# Patient Record
Sex: MALE | Race: WHITE | NOT HISPANIC OR LATINO | Employment: FULL TIME | URBAN - NONMETROPOLITAN AREA
[De-identification: names, ages, dates, MRNs, and addresses within clinical notes are randomized per-mention and may not be internally consistent; named-entity substitution may affect disease eponyms.]

---

## 2017-01-04 ENCOUNTER — OFFICE VISIT (OUTPATIENT)
Dept: CARDIAC SURGERY | Facility: CLINIC | Age: 52
End: 2017-01-04

## 2017-01-04 VITALS
OXYGEN SATURATION: 92 % | BODY MASS INDEX: 23.17 KG/M2 | WEIGHT: 147.6 LBS | TEMPERATURE: 98.4 F | DIASTOLIC BLOOD PRESSURE: 65 MMHG | SYSTOLIC BLOOD PRESSURE: 98 MMHG | HEIGHT: 67 IN | HEART RATE: 71 BPM

## 2017-01-04 DIAGNOSIS — Z01.818 PREOP EXAMINATION: Primary | ICD-10-CM

## 2017-01-04 DIAGNOSIS — I25.10 ATHEROSCLEROSIS OF NATIVE CORONARY ARTERY OF NATIVE HEART WITHOUT ANGINA PECTORIS: ICD-10-CM

## 2017-01-04 PROCEDURE — 99203 OFFICE O/P NEW LOW 30 MIN: CPT | Performed by: THORACIC SURGERY (CARDIOTHORACIC VASCULAR SURGERY)

## 2017-01-04 NOTE — LETTER
January 4, 2017     Clemente Johnson MD  [facility] [place] [address]    Patient: Dashawn Castañeda   YOB: 1965   Date of Visit: 1/4/2017       Dear Dr. Elizabeth MD:    Dashawn Castañeda was in my office today. Below are the relevant portions of my assessment and plan of care.       Pt will need CABG, risks and benefits explained and understood. Pt wishes to proceed with our plans.        Dashawn was seen today for f/u schedule cabg.    Diagnoses and all orders for this visit:    Preop examination  -     US Vein Mapping Bilateral    Atherosclerosis of native coronary artery of native heart without angina pectoris  -     US Vein Mapping Bilateral                  If you have questions, please do not hesitate to call me. I look forward to following Dashawn along with you.         Sincerely,        Germain Son MD        CC: No Recipients

## 2017-01-04 NOTE — PROGRESS NOTES
Subjective   Patient ID: Dashawn Castañeda is a 51 y.o. male is being seen for consultation today at the request of Dr Oconnor    History of Present Illness   This is a 51-year-old male with past medical history significant for coronary artery disease with stent placed by Dr. Johnson in 2007. The patient stated that he has since stopped his heart medications. He started becoming more short of breath, weakness on exertion, bilateral lower extremity edema. He presented to [facility] in cardiac arrest and cardiogenic shock on pressors and mechanical ventilation. He was diagnosed with bilateral pneumonia and was treated accordingly with antibiotics. Once he was recovered, he was extubated on 12/19/2016 and placed on nasal cannula. He underwent cardiac catheterization today after an echocardiogram demonstrated LV dysfunction of 20% with findings of the mid LAD at 75%, circumflex at 90%, OM-1 at greater than 90%, LIMA to 70%, RCA mid at 100% with left to right collaterals, with ejection fraction of 20% and left end-diastolic pressure of greater than 35. We were asked to see the patient for consideration of revascularization surgery.       The following portions of the patient's history were reviewed and updated as appropriate: problem list.    Review of Systems   Constitution: Positive for malaise/fatigue.   HENT: Negative.    Eyes: Negative.    Cardiovascular: Negative for chest pain.   Respiratory: Negative.    Hematologic/Lymphatic: Negative.    Skin: Negative.    Musculoskeletal: Negative.    Gastrointestinal: Negative.    Genitourinary: Negative.    Neurological: Negative.    Psychiatric/Behavioral: The patient is nervous/anxious.         Objective   Physical Exam   Constitutional: He is oriented to person, place, and time. He appears well-developed and well-nourished. No distress.   HENT:   Head: Normocephalic and atraumatic.   Eyes: Conjunctivae and EOM are normal. Pupils are equal, round, and reactive to  light.   Neck: Neck supple.   Cardiovascular: Normal rate, regular rhythm, normal heart sounds and intact distal pulses.    Pulmonary/Chest: Effort normal and breath sounds normal.   Abdominal: Soft.   Musculoskeletal: Normal range of motion.   Neurological: He is alert and oriented to person, place, and time. He has normal reflexes.   Skin: Skin is warm and dry.   Psychiatric: He has a normal mood and affect. His behavior is normal. Judgment and thought content normal.       Independent Review of Radiographic Studies:    1. Severe multivessel coronary artery disease with significant lesions noted in the mid left anterior descending coronary artery, proximal circumflex, obtuse marginal 1 and obtuse marginal 2 coronary artery and right coronary artery as described above. 2. LV dysfunction with elevated left ventricular end-diastolic pressure of 35 mmHg. Left ventriculogram was hence not performed. Distal right coronary    filled via left to right collaterals.   Carotids:  No hemodynamically significant stenoses are identified  within either internal carotid artery.      Assessment/Plan    Pt will need CABG, risks and benefits explained and understood. Pt wishes to proceed with our plans.        Dashawn was seen today for f/u schedule cabg.    Diagnoses and all orders for this visit:    Preop examination  -     US Vein Mapping Bilateral    Atherosclerosis of native coronary artery of native heart without angina pectoris  -     US Vein Mapping Bilateral

## 2017-01-09 ENCOUNTER — HOSPITAL ENCOUNTER (OUTPATIENT)
Dept: OTHER | Facility: HOSPITAL | Age: 52
Discharge: HOME | End: 2017-01-09
Attending: THORACIC SURGERY (CARDIOTHORACIC VASCULAR SURGERY) | Admitting: THORACIC SURGERY (CARDIOTHORACIC VASCULAR SURGERY)

## 2017-01-24 ENCOUNTER — OFFICE VISIT (OUTPATIENT)
Dept: CARDIAC SURGERY | Facility: CLINIC | Age: 52
End: 2017-01-24

## 2017-01-24 VITALS
HEIGHT: 67 IN | SYSTOLIC BLOOD PRESSURE: 106 MMHG | WEIGHT: 147.8 LBS | BODY MASS INDEX: 23.2 KG/M2 | OXYGEN SATURATION: 96 % | HEART RATE: 91 BPM | DIASTOLIC BLOOD PRESSURE: 68 MMHG | TEMPERATURE: 97.4 F

## 2017-01-24 DIAGNOSIS — F17.200 TOBACCO DEPENDENCE: ICD-10-CM

## 2017-01-24 DIAGNOSIS — I25.10 CORONARY ARTERY DISEASE INVOLVING NATIVE CORONARY ARTERY OF NATIVE HEART WITHOUT ANGINA PECTORIS: Primary | ICD-10-CM

## 2017-01-24 DIAGNOSIS — Z09 FOLLOW-UP SURGERY CARE: ICD-10-CM

## 2017-01-24 PROCEDURE — 99406 BEHAV CHNG SMOKING 3-10 MIN: CPT | Performed by: NURSE PRACTITIONER

## 2017-01-24 PROCEDURE — 99024 POSTOP FOLLOW-UP VISIT: CPT | Performed by: NURSE PRACTITIONER

## 2017-01-24 RX ORDER — ASCORBIC ACID 500 MG
500 TABLET ORAL DAILY
COMMUNITY

## 2017-01-24 RX ORDER — CLOPIDOGREL BISULFATE 75 MG/1
1 TABLET ORAL DAILY
Refills: 5 | COMMUNITY
Start: 2017-01-16 | End: 2017-04-27 | Stop reason: SDUPTHER

## 2017-01-24 RX ORDER — CARVEDILOL 3.12 MG/1
1 TABLET ORAL 2 TIMES DAILY
Refills: 2 | COMMUNITY
Start: 2017-01-16 | End: 2017-01-24 | Stop reason: DRUGHIGH

## 2017-01-24 RX ORDER — CARVEDILOL 6.25 MG/1
3.12 TABLET ORAL 2 TIMES DAILY WITH MEALS
COMMUNITY
End: 2017-03-15

## 2017-01-24 RX ORDER — FUROSEMIDE 20 MG/1
1 TABLET ORAL DAILY
Refills: 0 | COMMUNITY
Start: 2017-01-16 | End: 2017-02-01

## 2017-01-24 RX ORDER — PRAVASTATIN SODIUM 20 MG
1 TABLET ORAL DAILY
Refills: 2 | COMMUNITY
Start: 2017-01-16 | End: 2017-04-27 | Stop reason: SDUPTHER

## 2017-01-24 RX ORDER — HYDROCODONE BITARTRATE AND ACETAMINOPHEN 5; 325 MG/1; MG/1
1 TABLET ORAL 4 TIMES DAILY PRN
Refills: 0 | COMMUNITY
Start: 2017-01-16 | End: 2017-03-15

## 2017-01-24 RX ORDER — DIGOXIN 125 MCG
125 TABLET ORAL
Qty: 30 TABLET | Refills: 3 | Status: SHIPPED | OUTPATIENT
Start: 2017-01-24 | End: 2017-04-27 | Stop reason: SDUPTHER

## 2017-01-24 RX ORDER — MULTIVITAMIN WITH IRON
2 TABLET ORAL DAILY
COMMUNITY

## 2017-01-24 NOTE — PATIENT INSTRUCTIONS
Signs and symptoms of infection including drainage from operative site, redness, swelling, with associated fever and/or chills notify Heart and Vascular center immediately for wound check.      Clean operative site with antibacterial soap/water, pat dry. Keep open to air unless draining, then may apply dry dressing.  No ointments or creams unless prescribed by provider.    Smoking cessation assistance options offered including behavioral counseling (Smoking Cessation Classes), Nicotine replacement therapy (patches or gum), pharmacologic therapy (Chantix, Wellbutrin).  Discussion and question answer period 5-7 minutes.     Keep scheduled Follow up: Dr. Son with Labs, CXR, EKG    Med Mgmt: ASA,PLAVIX,COREG,STATIN. Restart Digoxin

## 2017-01-24 NOTE — PROGRESS NOTES
Subjective   Patient ID: Dashawn Castañeda is a 51 y.o. male is here today for follow-up CABG.    History of Present Illness  The following portions of the patient's history were reviewed and updated as appropriate: allergies, current medications, past family history, past medical history, past social history, past surgical history and problem list.  PCP: Elizabeth  Card: Elvin    History of Present Illness   This is a 51-year-old male with past medical history significant for coronary artery disease with stent placed by Dr. Johnson in 2007. The patient stated that he has since stopped his heart medications. He started becoming more short of breath, weakness on exertion, bilateral lower extremity edema. He presented to [facility] in cardiac arrest and cardiogenic shock on pressors and mechanical ventilation. He was diagnosed with bilateral pneumonia and was treated accordingly with antibiotics. Once he was recovered, he was extubated on 12/19/2016 and placed on nasal cannula. He underwent cardiac catheterization today after an echocardiogram demonstrated LV dysfunction of 20% with findings of the mid LAD at 75%, circumflex at 90%, OM-1 at greater than 90%, LIMA to 70%, RCA mid at 100% with left to right collaterals, with ejection fraction of 20% and left end-diastolic pressure of greater than 35. Satisfactory preop workup was completed and he presented for CABG in which he recovered well.  Returns for follow up continues to smoke.     12/22/16: Carotid duplex: 0% BICA Stenosis  12/2016: EF 20%. Valves WNL  1/12/17: CABGX4 LIMA-LAD, SVG-OM1, SVG-OM2, SVG-PDA, EVH      Current Outpatient Prescriptions:   •  albuterol (PROVENTIL) (5 MG/ML) 0.5% nebulizer solution, Take 2.5 mg by nebulization Every 6 (Six) Hours As Needed for wheezing., Disp: , Rfl:   •  aspirin 81 MG chewable tablet, Chew 81 mg Daily., Disp: , Rfl:   •  carvedilol (COREG) 6.25 MG tablet, Take 6.25 mg by mouth 2 (Two) Times a Day With Meals., Disp: ,  Rfl:   •  clopidogrel (PLAVIX) 75 MG tablet, Take 1 tablet by mouth Daily., Disp: , Rfl: 5  •  digoxin (LANOXIN) 125 MCG tablet, Take 1 tablet by mouth Daily., Disp: 30 tablet, Rfl: 3  •  furosemide (LASIX) 20 MG tablet, Take 1 tablet by mouth Daily., Disp: , Rfl: 0  •  HYDROcodone-acetaminophen (NORCO) 5-325 MG per tablet, Take 1 tablet by mouth 4 (Four) Times a Day As Needed., Disp: , Rfl: 0  •  Magnesium 250 MG tablet, Take 2 tablets by mouth Daily., Disp: , Rfl:   •  Multiple Vitamins-Minerals (COMPLETE MULTIVITAMIN/MINERAL PO), Take 1 tablet by mouth Daily., Disp: , Rfl:   •  pravastatin (PRAVACHOL) 20 MG tablet, Take 1 tablet by mouth Daily., Disp: , Rfl: 2  •  Sennosides-Docusate Sodium (SENNA S PO), Take  by mouth., Disp: , Rfl:   •  vitamin C (ASCORBIC ACID) 500 MG tablet, Take 500 mg by mouth Daily., Disp: , Rfl:     Review of Systems   Constitution: Negative for fever, weakness and malaise/fatigue.   HENT: Negative for hoarse voice.    Eyes: Negative for visual disturbance.   Cardiovascular: Positive for leg swelling. Negative for chest pain, claudication, cyanosis and dyspnea on exertion.   Respiratory: Negative for cough and shortness of breath.    Hematologic/Lymphatic: Negative for bleeding problem.   Skin: Negative for poor wound healing.   Musculoskeletal: Negative for falls.   Gastrointestinal: Negative for abdominal pain, constipation and dysphagia.   Genitourinary: Negative for dysuria.   Neurological: Negative for light-headedness.   All other systems reviewed and are negative.       Objective   Physical Exam   Constitutional: He is oriented to person, place, and time. He appears well-developed.   HENT:   Head: Normocephalic.   Eyes: Pupils are equal, round, and reactive to light.   Neck: Normal range of motion.   Cardiovascular: Normal rate, normal heart sounds and intact distal pulses.    Pulmonary/Chest: Effort normal and breath sounds normal. No respiratory distress.   Abdominal: Soft. Bowel  sounds are normal.   Musculoskeletal: Normal range of motion. He exhibits edema.   LLE - EVH 2+ edema. Sternum stable with moderate pressure. CT sutures removed, steri strips applied. CDI.   Neurological: He is alert and oriented to person, place, and time.   Skin: Skin is warm and dry. There is erythema.   LLE - EVH sites with resolving eccchymosis. Erythema around incisional sites. Sutures removed, steri strips applied.   Vitals reviewed.      Assessment/Plan   Independent Review of Radiographic Studies:    Detailed discussion regarding risks, benefits, and treatment plan.  Patient understands, agrees, and wishes to proceed with plan.  Progressing well.     1. Coronary artery disease involving native coronary artery of native heart without angina pectoris  Med Mgmt: ASA,PLAVIX,BETA,STATIN,DIG. HOLD ARB.  - digoxin (LANOXIN) 125 MCG tablet; Take 1 tablet by mouth Daily.  Dispense: 30 tablet; Refill: 3  - CBC (No Diff); Future  - Comprehensive Metabolic Panel; Future  - XR Chest 2 View; Future  - ECG 12 Lead    2. Follow-up surgery care  Lifting restrictions 12 weeks.  Keep Follow up with Dr. Son  Cardiac Rehab appointment  Signs and symptoms of infection including drainage from operative site, redness, swelling, with associated fever and/or chills notify Heart and Vascular center immediately for wound check.    Clean operative site with antibacterial soap/water, pat dry. Keep open to air unless draining, then may apply dry dressing.  No ointments or creams unless prescribed by provider.   Apply compression stocking LLE, elevate higher than hip QID (GENE Hose given #1)    3. Tobacco dependence  Smoking cessation assistance options offered including behavioral counseling (Smoking Cessation Classes), Nicotine replacement therapy (patches or gum), pharmacologic therapy (Chantix, Wellbutrin).  Discussion and question answer period 5-7 minutes. Advised against recreational drug use: Marijuana.

## 2017-01-24 NOTE — MR AVS SNAPSHOT
Dashawn Castañeda   1/24/2017 9:40 AM   Office Visit    Dept Phone:  [phone]   Encounter #:  [id number]    Provider:  NARENDRA Canela   Department:  [facility] CARDIOTHORACIC AND VASCULAR SURGERY                Your Full Care Plan              Today's Medication Changes          These changes are accurate as of: 1/24/17 10:10 AM.  If you have any questions, ask your nurse or doctor.               Medication(s)that have changed:     carvedilol 6.25 MG tablet   Commonly known as:  COREG   Take 6.25 mg by mouth 2 (Two) Times a Day With Meals.   What changed:  Another medication with the same name was removed. Continue taking this medication, and follow the directions you see here.   Changed by:  NARENDRA Canela       furosemide 20 MG tablet   Commonly known as:  LASIX   Take 1 tablet by mouth Daily.   What changed:  Another medication with the same name was removed. Continue taking this medication, and follow the directions you see here.   Changed by:  NARENDRA Canela         Stop taking medication(s)listed here:     DIUREX .5 MG tablet   Generic drug:  Caffeine-Magnesium Salicylate   Stopped by:  NARENDRA Canela           losartan 25 MG tablet   Commonly known as:  COZAAR   Stopped by:  NARENDRA Canela           spironolactone 25 MG tablet   Commonly known as:  ALDACTONE   Stopped by:  NARENDRA Canela                Where to Get Your Medications      These medications were sent to [address] [phone] [place] [id number] [address] [address]     Phone:  [phone]     digoxin 125 MCG tablet                  Your Updated Medication List          This list is accurate as of: 1/24/17 10:10 AM.  Always use your most recent med list.                albuterol (5 MG/ML) 0.5% nebulizer solution   Commonly known as:  PROVENTIL       aspirin 81 MG  chewable tablet       carvedilol 6.25 MG tablet   Commonly known as:  COREG       clopidogrel 75 MG tablet   Commonly known as:  PLAVIX       COMPLETE MULTIVITAMIN/MINERAL PO       digoxin 125 MCG tablet   Commonly known as:  LANOXIN   Take 1 tablet by mouth Daily.       furosemide 20 MG tablet   Commonly known as:  LASIX       HYDROcodone-acetaminophen 5-325 MG per tablet   Commonly known as:  NORCO       Magnesium 250 MG tablet       pravastatin 20 MG tablet   Commonly known as:  PRAVACHOL       SENNA S PO       vitamin C 500 MG tablet   Commonly known as:  ASCORBIC ACID               We Performed the Following     ECG 12 Lead       You Were Diagnosed With        Codes Comments    Coronary artery disease involving native coronary artery of native heart without angina pectoris    -  Primary ICD-10-CM: I25.10  ICD-9-CM: 414.01     Follow-up surgery care     ICD-10-CM: Z09  ICD-9-CM: V67.00       Instructions    Signs and symptoms of infection including drainage from operative site, redness, swelling, with associated fever and/or chills notify Heart and Vascular center immediately for wound check.      Clean operative site with antibacterial soap/water, pat dry. Keep open to air unless draining, then may apply dry dressing.  No ointments or creams unless prescribed by provider.    Smoking cessation assistance options offered including behavioral counseling (Smoking Cessation Classes), Nicotine replacement therapy (patches or gum), pharmacologic therapy (Chantix, Wellbutrin).  Discussion and question answer period 5-7 minutes.     Keep scheduled Follow up: Dr. Son with Labs, CXR, EKG    Med Mgmt: ASA,PLAVIX,COREG,STATIN. Restart Digoxin     Patient Instructions History      Upcoming Appointments     Visit Type Date Time Department    FOLLOW UP 1/24/2017  9:40 AM [facility] SURGERY [facility]    OFFICE VISIT 1/27/2017  1:15 PM [address]    FOLLOW UP 2/1/2017  8:00 AM [facility] SURGERY [facility]    HOSPITAL FOLLOW UP  "3/14/2017  8:45 AM [facility]      Sandhya Signup     Our records indicate that you have declined [facility] signup. If you would like to sign up for [employer], please email [email or web address] or call [phone] to obtain an activation code.             Other Info from Your Visit           Your Appointments     Jan 27, 2017  1:15 PM CST   Office Visit with Clemente Johnson MD   [facility] FAMILY MEDICINE (--)    [address]  [address] [facility] [address] [phone]           Arrive 15 minutes prior to appointment.            Feb 01, 2017  8:00 AM CST   Follow Up with Germain Son MD   [facility] CARDIOTHORACIC AND VASCULAR SURGERY (--)    [address] [place] [address] [address]  [address] [place] [address] [phone]           Arrive 15 minutes prior to appointment.            Mar 14, 2017  8:45 AM T   Hospital Follow Up with Alicia Pearce MD   [facility] CARDIOLOGY (--)    [address] [facility] [address]  [facility] [address] [phone]              Allergies     No Known Allergies      Reason for Visit     Coronary Artery Disease 1 week f/u CABGx4 1/12/17      Vital Signs     Blood Pressure Pulse Temperature Height Weight Oxygen Saturation    106/68 (BP Location: Left arm) 91 97.4 °F (36.3 °C) (Oral) 67\" (170.2 cm) 147 lb 12.8 oz (67 kg) 96%    Body Mass Index Smoking Status                23.15 kg/m2 Current Some Day Smoker          Problems and Diagnoses Noted     Coronary artery disease involving native coronary artery without angina pectoris    Encounter for examination following surgery        "

## 2017-01-27 ENCOUNTER — LAB (OUTPATIENT)
Dept: LAB | Facility: HOSPITAL | Age: 52
End: 2017-01-27

## 2017-01-27 ENCOUNTER — OFFICE VISIT (OUTPATIENT)
Dept: FAMILY MEDICINE CLINIC | Facility: CLINIC | Age: 52
End: 2017-01-27

## 2017-01-27 VITALS
OXYGEN SATURATION: 99 % | DIASTOLIC BLOOD PRESSURE: 70 MMHG | BODY MASS INDEX: 23.07 KG/M2 | SYSTOLIC BLOOD PRESSURE: 108 MMHG | HEIGHT: 67 IN | HEART RATE: 87 BPM | WEIGHT: 147 LBS

## 2017-01-27 DIAGNOSIS — I25.10 CORONARY ARTERY DISEASE INVOLVING NATIVE CORONARY ARTERY OF NATIVE HEART WITHOUT ANGINA PECTORIS: ICD-10-CM

## 2017-01-27 DIAGNOSIS — J18.9 PNEUMONIA OF BOTH LOWER LOBES DUE TO INFECTIOUS ORGANISM: ICD-10-CM

## 2017-01-27 DIAGNOSIS — Z11.59 NEED FOR HEPATITIS C SCREENING TEST: ICD-10-CM

## 2017-01-27 DIAGNOSIS — I25.10 CORONARY ARTERY DISEASE INVOLVING NATIVE CORONARY ARTERY OF NATIVE HEART WITHOUT ANGINA PECTORIS: Primary | ICD-10-CM

## 2017-01-27 LAB
ALBUMIN SERPL-MCNC: 3.9 G/DL (ref 3.4–4.8)
ALBUMIN/GLOB SERPL: 1.1 G/DL (ref 1.1–1.8)
ALP SERPL-CCNC: 87 U/L (ref 38–126)
ALT SERPL W P-5'-P-CCNC: 29 U/L (ref 21–72)
ANION GAP SERPL CALCULATED.3IONS-SCNC: 8 MMOL/L (ref 5–15)
ARTICHOKE IGE QN: 69 MG/DL (ref 1–129)
AST SERPL-CCNC: 43 U/L (ref 17–59)
BILIRUB SERPL-MCNC: 0.6 MG/DL (ref 0.2–1.3)
BUN BLD-MCNC: 7 MG/DL (ref 7–21)
BUN/CREAT SERPL: 9.1 (ref 7–25)
CALCIUM SPEC-SCNC: 9.4 MG/DL (ref 8.4–10.2)
CHLORIDE SERPL-SCNC: 100 MMOL/L (ref 95–110)
CHOLEST SERPL-MCNC: 148 MG/DL (ref 0–199)
CO2 SERPL-SCNC: 32 MMOL/L (ref 22–31)
CREAT BLD-MCNC: 0.77 MG/DL (ref 0.7–1.3)
DEPRECATED RDW RBC AUTO: 55.5 FL (ref 35.1–43.9)
ERYTHROCYTE [DISTWIDTH] IN BLOOD BY AUTOMATED COUNT: 16.4 % (ref 11.5–14.5)
GFR SERPL CREATININE-BSD FRML MDRD: 107 ML/MIN/1.73 (ref 56–130)
GLOBULIN UR ELPH-MCNC: 3.4 GM/DL (ref 2.3–3.5)
GLUCOSE BLD-MCNC: 82 MG/DL (ref 60–100)
HCT VFR BLD AUTO: 35.1 % (ref 39–49)
HCV AB SER DONR QL: NEGATIVE
HCV S/C RATIO: 0.02 (ref 0–0.89)
HDLC SERPL-MCNC: 37 MG/DL (ref 60–200)
HGB BLD-MCNC: 11.1 G/DL (ref 13.7–17.3)
LDLC/HDLC SERPL: 1.9 {RATIO} (ref 0–3.55)
MCH RBC QN AUTO: 29.4 PG (ref 26.5–34)
MCHC RBC AUTO-ENTMCNC: 31.6 G/DL (ref 31.5–36.3)
MCV RBC AUTO: 92.9 FL (ref 80–98)
PLATELET # BLD AUTO: 449 10*3/MM3 (ref 150–450)
PMV BLD AUTO: 10.1 FL (ref 8–12)
POTASSIUM BLD-SCNC: 4.8 MMOL/L (ref 3.5–5.1)
PROT SERPL-MCNC: 7.3 G/DL (ref 6.3–8.6)
RBC # BLD AUTO: 3.78 10*6/MM3 (ref 4.37–5.74)
SODIUM BLD-SCNC: 140 MMOL/L (ref 137–145)
TRIGL SERPL-MCNC: 204 MG/DL (ref 20–199)
WBC NRBC COR # BLD: 10.28 10*3/MM3 (ref 3.2–9.8)

## 2017-01-27 PROCEDURE — 80053 COMPREHEN METABOLIC PANEL: CPT | Performed by: FAMILY MEDICINE

## 2017-01-27 PROCEDURE — 86803 HEPATITIS C AB TEST: CPT | Performed by: FAMILY MEDICINE

## 2017-01-27 PROCEDURE — 99214 OFFICE O/P EST MOD 30 MIN: CPT | Performed by: FAMILY MEDICINE

## 2017-01-27 PROCEDURE — 85027 COMPLETE CBC AUTOMATED: CPT | Performed by: NURSE PRACTITIONER

## 2017-01-27 PROCEDURE — 36415 COLL VENOUS BLD VENIPUNCTURE: CPT | Performed by: FAMILY MEDICINE

## 2017-01-27 PROCEDURE — 80061 LIPID PANEL: CPT | Performed by: FAMILY MEDICINE

## 2017-01-27 NOTE — PROGRESS NOTES
Subjective   Dashawn Castañeda is a 51 y.o. male.     Coronary Artery Disease   Presents for follow-up visit. Symptoms include leg swelling and weight gain. Pertinent negatives include no chest pain, chest pressure, chest tightness, palpitations or shortness of breath. Risk factors include hyperlipidemia. The symptoms have been stable.   Pneumonia   He complains of frequent throat clearing. There is no chest tightness, cough, difficulty breathing, hemoptysis, hoarse voice, shortness of breath, sputum production or wheezing. This is a new problem. The current episode started 1 to 4 weeks ago. The problem occurs constantly. The problem has been gradually improving. Pertinent negatives include no chest pain, fever or heartburn. Myalgias: at IV site.   Hyperlipidemia   Pertinent negatives include no chest pain or shortness of breath. Myalgias: at IV site.        The following portions of the patient's history were reviewed and updated as appropriate: allergies, current medications, past family history, past medical history, past social history, past surgical history and problem list.    Review of Systems   Constitutional: Positive for weight gain. Negative for chills and fever.   HENT: Negative for hoarse voice.    Respiratory: Negative for cough, hemoptysis, sputum production, chest tightness, shortness of breath and wheezing.    Cardiovascular: Positive for leg swelling. Negative for chest pain and palpitations.   Gastrointestinal: Negative for heartburn.        Dysphagia to solids since on vent.   Musculoskeletal: Myalgias: at IV site.   Skin: Positive for rash (ecchymosis left thigh and leg). Negative for wound.   Neurological: Positive for numbness.       Objective   Physical Exam   Constitutional: He is oriented to person, place, and time. He appears well-developed and well-nourished. No distress.   HENT:   Head: Normocephalic and atraumatic.   Right Ear: Hearing normal.   Left Ear: Hearing normal.   Mouth/Throat: Uvula  is midline and mucous membranes are normal.   Cardiovascular: Normal rate, regular rhythm, normal heart sounds and intact distal pulses.  Exam reveals no gallop and no friction rub.    No murmur heard.  Pulmonary/Chest: Effort normal. No respiratory distress. He has decreased breath sounds. He has no wheezes. He has rhonchi. He has no rales. He exhibits no tenderness.   Abdominal: Normal appearance. There is no hepatosplenomegaly.   Musculoskeletal: He exhibits edema. He exhibits no tenderness.       Vascular Status -  His exam exhibits right foot vasculature normal. His exam exhibits no right foot edema. His exam exhibits left foot vasculature normal and left foot edema.  Lymphadenopathy:        Right cervical: No superficial cervical, no deep cervical and no posterior cervical adenopathy present.       Left cervical: No superficial cervical, no deep cervical and no posterior cervical adenopathy present.   Neurological: He is alert and oriented to person, place, and time. No sensory deficit.   Skin: Skin is warm and dry. No rash noted. He is not diaphoretic.   Psychiatric: He has a normal mood and affect. His behavior is normal. Judgment and thought content normal. Cognition and memory are normal.   Nursing note and vitals reviewed.      Assessment/Plan   Problems Addressed this Visit        Cardiovascular and Mediastinum    Coronary artery disease involving native coronary artery without angina pectoris - Primary    Relevant Orders    Comprehensive Metabolic Panel       Respiratory    Pneumonia of both lower lobes due to infectious organism    Relevant Orders    Lipid Panel       Other    Need for hepatitis C screening test    Relevant Orders    Hepatitis C Antibody              Follow-up post CABG

## 2017-01-27 NOTE — MR AVS SNAPSHOT
Dashawn Castañeda   1/27/2017 1:15 PM   Office Visit    Dept Phone:  [phone]   Encounter #:  [id number]    Provider:  Clemente Johnson MD   Department:  [facility] FAMILY MEDICINE                Your Full Care Plan              Your Updated Medication List          This list is accurate as of: 1/27/17  1:32 PM.  Always use your most recent med list.                albuterol (5 MG/ML) 0.5% nebulizer solution   Commonly known as:  PROVENTIL       aspirin 81 MG chewable tablet       carvedilol 6.25 MG tablet   Commonly known as:  COREG       clopidogrel 75 MG tablet   Commonly known as:  PLAVIX       COMPLETE MULTIVITAMIN/MINERAL PO       digoxin 125 MCG tablet   Commonly known as:  LANOXIN   Take 1 tablet by mouth Daily.       furosemide 20 MG tablet   Commonly known as:  LASIX       HYDROcodone-acetaminophen 5-325 MG per tablet   Commonly known as:  NORCO       Magnesium 250 MG tablet       pravastatin 20 MG tablet   Commonly known as:  PRAVACHOL       SENNA S PO       vitamin C 500 MG tablet   Commonly known as:  ASCORBIC ACID               We Performed the Following     Comprehensive Metabolic Panel     Hepatitis C Antibody     Lipid Panel       You Were Diagnosed With        Codes Comments    Coronary artery disease involving native coronary artery of native heart without angina pectoris    -  Primary ICD-10-CM: I25.10  ICD-9-CM: 414.01     Pneumonia of both lower lobes due to infectious organism     ICD-10-CM: J18.9  ICD-9-CM: 483.8     Need for hepatitis C screening test     ICD-10-CM: Z11.59  ICD-9-CM: V73.89       Instructions     None    Patient Instructions History      Upcoming Appointments     Visit Type Date Time Department    OFFICE VISIT 1/27/2017  1:15 PM [facility] 4TH    FOLLOW UP 2/1/2017  8:00 AM [facility] CT VAS SURGERY MAD    XR MAD CHEST 2 VW 2/1/2017  7:45 AM  MAD XRAY    LAB 2/1/2017  7:40 AM [facility] LABORATORY    HOSPITAL FOLLOW UP 3/14/2017  8:45 AM  "[facility]    OFFICE VISIT 4/27/2017  1:30 PM [facility] MED MAD 4TH      MyChart Signup     Our records indicate that you have declined [facility] [employer] signup. If you would like to sign up for [employer], please email [email or web address] or call [phone] to obtain an activation code.             Other Info from Your Visit           Your Appointments     Feb 01, 2017  7:40 AM CST   Lab with LAB [facility]   [facility] LABORATORY ([address] [address] [phone]            Feb 01, 2017  7:45 AM CST   XR mad chest 2 vw with MAD XR 6   [facility] XRAY ([address] [address] [phone]           Bring along any outside films relating to this procedure. Arrive 15 minutes before your scheduled time.            Feb 01, 2017  8:00 AM CST   Follow Up with Germain Son MD   [facility] CARDIOTHORACIC AND VASCULAR SURGERY (--)    [address] [place] [address] [address]  [address] [facility] [address] [phone]           Arrive 15 minutes prior to appointment.            Mar 14, 2017  8:45 AM T   Hospital Follow Up with Alicia Pearce MD   [facility] CARDIOLOGY (--)    [address]  [facility] [address] [phone]            Apr 27, 2017  1:30 PM CDT   Office Visit with Clemente Johnson MD   [facility] FAMILY MEDICINE (--)    [address] [facility] [address]  [address] [facility] [address] [phone]           Arrive 15 minutes prior to appointment.              Allergies     No Known Allergies      Reason for Visit     Coronary Artery Disease 1 month recheck      Vital Signs     Blood Pressure Pulse Height Weight Oxygen Saturation Body Mass Index    108/70 87 67\" (170.2 cm) 147 lb (66.7 kg) 99% 23.02 kg/m2    Smoking Status                   Current Some Day Smoker         "   Problems and Diagnoses Noted     Coronary artery disease involving native coronary artery without angina pectoris    Need for hepatitis C screening test    Pneumonia of both lower lobes due to infectious organism

## 2017-01-30 ENCOUNTER — TELEPHONE (OUTPATIENT)
Dept: FAMILY MEDICINE CLINIC | Facility: CLINIC | Age: 52
End: 2017-01-30

## 2017-01-30 NOTE — TELEPHONE ENCOUNTER
Lab Results & Recommendations Relayed as Normal, Pr Dr. Johnson.  Patient is advised to continue his current medications and follow-up as recommended.     ----- Message from Clemente Johnson MD sent at 1/29/2017 11:40 AM CST -----  Ok, call or send card.

## 2017-02-01 ENCOUNTER — APPOINTMENT (OUTPATIENT)
Dept: LAB | Facility: HOSPITAL | Age: 52
End: 2017-02-01

## 2017-02-01 ENCOUNTER — HOSPITAL ENCOUNTER (OUTPATIENT)
Dept: GENERAL RADIOLOGY | Facility: HOSPITAL | Age: 52
Discharge: HOME | End: 2017-02-01
Admitting: NURSE PRACTITIONER

## 2017-02-01 ENCOUNTER — OFFICE VISIT (OUTPATIENT)
Dept: CARDIAC SURGERY | Facility: CLINIC | Age: 52
End: 2017-02-01

## 2017-02-01 VITALS
SYSTOLIC BLOOD PRESSURE: 113 MMHG | WEIGHT: 146.6 LBS | OXYGEN SATURATION: 99 % | HEART RATE: 74 BPM | HEIGHT: 67 IN | DIASTOLIC BLOOD PRESSURE: 81 MMHG | TEMPERATURE: 98.6 F | BODY MASS INDEX: 23.01 KG/M2

## 2017-02-01 DIAGNOSIS — Z09 FOLLOW-UP SURGERY CARE: ICD-10-CM

## 2017-02-01 DIAGNOSIS — I25.10 CORONARY ARTERY DISEASE INVOLVING NATIVE CORONARY ARTERY OF NATIVE HEART WITHOUT ANGINA PECTORIS: ICD-10-CM

## 2017-02-01 DIAGNOSIS — F17.200 TOBACCO DEPENDENCE: ICD-10-CM

## 2017-02-01 PROCEDURE — 99024 POSTOP FOLLOW-UP VISIT: CPT | Performed by: THORACIC SURGERY (CARDIOTHORACIC VASCULAR SURGERY)

## 2017-02-01 PROCEDURE — 71020 HC CHEST PA AND LATERAL: CPT

## 2017-02-01 PROCEDURE — 93010 ELECTROCARDIOGRAM REPORT: CPT | Performed by: INTERNAL MEDICINE

## 2017-02-01 PROCEDURE — 93005 ELECTROCARDIOGRAM TRACING: CPT | Performed by: NURSE PRACTITIONER

## 2017-02-01 NOTE — PROGRESS NOTES
"CARDIOVASCULAR SURGERY FOLLOW-UP PROGRESS NOTE    Subjective:     Patient is a 51 y.o. male who presents for follow up s/p CABG.  He comes in today complaining of nothing.  His activity level has been good.       Objective:         Visit Vitals   • /81 (BP Location: Left arm)   • Pulse 74   • Temp 98.6 °F (37 °C) (Oral)   • Ht 67\" (170.2 cm)   • Wt 146 lb 9.6 oz (66.5 kg)   • SpO2 99%   • BMI 22.96 kg/m2     Heart:  regular rate and rhythm, S1, S2 normal, no murmur, click, rub or gallop  Lungs:  clear to auscultation bilaterally  Extremities:  no edema  Incision(s):  left leg healing well, sternum stable    Data Review:  Labs:    CBC with Differential:    Lab Results   Component Value Date    WBC 10.28 (H) 01/27/2017    WBC 14.0 (H) 01/15/2017    RBC 3.78 (L) 01/27/2017    RBC 2.97 (L) 01/15/2017    HGB 11.1 (L) 01/27/2017    HGB 8.7 (L) 01/15/2017    HCT 35.1 (L) 01/27/2017    HCT 26.6 (L) 01/15/2017     01/27/2017     (L) 01/15/2017    MCV 92.9 01/27/2017    MCV 89.6 01/15/2017    MCH 29.4 01/27/2017    MCH 29.3 01/15/2017    MCHC 31.6 01/27/2017    MCHC 32.7 01/15/2017    RDW 16.4 (H) 01/27/2017    RDW 14.5 01/15/2017   [  BMP:     Lab Results   Component Value Date     01/27/2017     01/15/2017    K 4.8 01/27/2017    K 4.6 01/16/2017     01/27/2017    CL 99 01/15/2017    CO2 32.0 (H) 01/27/2017    CO2 31 01/15/2017    BUN 7 01/27/2017    BUN 19 01/15/2017    CREATININE 0.77 01/27/2017    CREATININE 0.7 01/15/2017     CXR:  normal, no significant pleural effusion    Assessment:     S/P CABG    No significant post-op complications    Plan:     No heavy lifting > 10 pounds for 6 more weeks  Keep incisions clean and dry  OK to begin cardiac rehab  Follow-up as scheduled with cardiology  Return to clinic in 4 week(s) with no new studies  D/c lasix  "

## 2017-03-15 ENCOUNTER — OFFICE VISIT (OUTPATIENT)
Dept: CARDIOLOGY | Facility: CLINIC | Age: 52
End: 2017-03-15

## 2017-03-15 VITALS
DIASTOLIC BLOOD PRESSURE: 78 MMHG | WEIGHT: 149 LBS | HEIGHT: 67 IN | SYSTOLIC BLOOD PRESSURE: 110 MMHG | HEART RATE: 72 BPM | BODY MASS INDEX: 23.39 KG/M2

## 2017-03-15 DIAGNOSIS — Z95.1 S/P CABG (CORONARY ARTERY BYPASS GRAFT): ICD-10-CM

## 2017-03-15 DIAGNOSIS — I25.5 ISCHEMIC CARDIOMYOPATHY: ICD-10-CM

## 2017-03-15 DIAGNOSIS — I25.10 CORONARY ARTERY DISEASE INVOLVING NATIVE CORONARY ARTERY OF NATIVE HEART WITHOUT ANGINA PECTORIS: Primary | ICD-10-CM

## 2017-03-15 PROCEDURE — 93000 ELECTROCARDIOGRAM COMPLETE: CPT | Performed by: INTERNAL MEDICINE

## 2017-03-15 PROCEDURE — 99213 OFFICE O/P EST LOW 20 MIN: CPT | Performed by: INTERNAL MEDICINE

## 2017-03-15 RX ORDER — CARVEDILOL 3.12 MG/1
3.12 TABLET ORAL 2 TIMES DAILY WITH MEALS
COMMUNITY
End: 2017-04-27 | Stop reason: SDUPTHER

## 2017-03-15 NOTE — PROGRESS NOTES
Dashawn Castañeda  52 y.o. male    03/15/2017  1. Coronary artery disease involving native coronary artery of native heart without angina pectoris    2. Ischemic cardiomyopathy    3. S/P CABG (coronary artery bypass graft)        History of Present Illness    Mr. Castañeda is a 52-year-old  male with severe multivessel coronary artery disease and ischemic cardiomyopathy.  He presented with the cardiac respiratory arrest in December 2016 and cardiac catheterization showed multivessel CAD.  He underwent coronary coronary artery bypass surgery in January 2017 and has done remarkably well since the procedure.  He returned to work about 3 weeks after bypass surgery and has gone through cardiac rehabilitation.  He denied any chest pain or shortness of breath and has been physically quite active.    His EKG today showed sinus rhythm heart rate of 77 bpm mild IVCD and nonspecific ST-T changes and poor R wave progression in the anteroseptal leads QRS duration was 110 ms.  No signs of congestive heart failure was noted.        SUBJECTIVE    No Known Allergies      Past Medical History   Diagnosis Date   • Allergic      gras   • Anxiety    • CHF (congestive heart failure)    • COPD (chronic obstructive pulmonary disease)    • Coronary artery disease      post MI Stents   • Hyperlipidemia    • Hypertension    • Myocardial infarction          Past Surgical History   Procedure Laterality Date   • Cardiac catheterization     • Coronary artery bypass graft           Family History   Problem Relation Age of Onset   • Hypertension Mother    • Heart disease Father          Social History     Social History   • Marital status:      Spouse name: N/A   • Number of children: N/A   • Years of education: N/A     Occupational History   • Not on file.     Social History Main Topics   • Smoking status: Former Smoker     Packs/day: 0.50     Years: 38.00     Types: Cigarettes   • Smokeless tobacco: Never Used   • Alcohol use No       "Comment: quit 1 year ago   • Drug use: Yes     Special: Marijuana   • Sexual activity: Yes     Other Topics Concern   • Not on file     Social History Narrative         Current Outpatient Prescriptions   Medication Sig Dispense Refill   • albuterol (PROVENTIL) (5 MG/ML) 0.5% nebulizer solution Take 2.5 mg by nebulization Every 6 (Six) Hours As Needed for wheezing.     • aspirin 81 MG chewable tablet Chew 81 mg Daily.     • carvedilol (COREG) 3.125 MG tablet Take 3.125 mg by mouth 2 (Two) Times a Day With Meals.     • clopidogrel (PLAVIX) 75 MG tablet Take 1 tablet by mouth Daily.  5   • digoxin (LANOXIN) 125 MCG tablet Take 1 tablet by mouth Daily. 30 tablet 3   • Magnesium 250 MG tablet Take 2 tablets by mouth Daily.     • Multiple Vitamins-Minerals (COMPLETE MULTIVITAMIN/MINERAL PO) Take 1 tablet by mouth Daily.     • pravastatin (PRAVACHOL) 20 MG tablet Take 1 tablet by mouth Daily.  2   • Sennosides-Docusate Sodium (SENNA S PO) Take  by mouth.     • vitamin C (ASCORBIC ACID) 500 MG tablet Take 500 mg by mouth Daily.       No current facility-administered medications for this visit.          OBJECTIVE    Visit Vitals   • /78   • Pulse 72   • Ht 67\" (170.2 cm)   • Wt 149 lb (67.6 kg)   • BMI 23.34 kg/m2           Review of Systems     Constitutional:  Denies recent weight loss, weight gain, fever or chills     HENT:  Denies any hearing loss, epistaxis, hoarseness, or difficulty speaking.     Eyes: Wears eyeglasses or contact lenses     Respiratory:  Denies dyspnea with exertion,no cough, wheezing, or hemoptysis.     Cardiovascular: Negative for palpations, chest pain, orthopnea, PND, peripheral edema, syncope, or claudication.     Gastrointestinal:  Denies change in bowel habits, dyspepsia, ulcer disease, hematochezia, or melena.     Endocrine: Negative for cold intolerance, heat intolerance, polydipsia, polyphagia and polyuria. Denies any history of weight change, heat/cold intolerance, polydipsia, polyuria "     Genitourinary: Negative.      Musculoskeletal: Denies any history of arthritic symptoms or back problems     Skin:  Denies any change in hair or nails, rashes, or skin lesions.     Allergic/Immunologic: Negative.  Negative for environmental allergies, food allergies and immunocompromised state.     Neurological:  Denies any history of recurrent headaches, strokes, TIA, or seizure disorder.     Hematological: Denies any food allergies, seasonal allergies, bleeding disorders, or lymphadenopathy.     Psychiatric/Behavioral: Denies any history of depression, substance abuse, or change in cognitive function.         Physical Exam     Constitutional: Cooperative, alert and oriented, well-developed, well-nourished, in no acute distress.     HENT:   Head: Normocephalic, normal hair patterns, no masses or tenderness.  Ears, Nose, and Throat: No gross abnormalities. No pallor or cyanosis. Dentition good.   Eyes: EOMS intact, PERRL, conjunctivae and lids unremarkable. Fundoscopic exam and visual fields not performed.   Neck: No palpable masses or adenopathy, no thyromegaly, no JVD, carotid pulses are full and equal bilaterally and without  Bruits.     Cardiovascular: Regular rhythm, S1 and S2 normal, no S3 or S4. Apical impulse not displaced. No murmurs, gallops, or rubs detected.     Pulmonary/Chest: Chest: normal symmetry, no tenderness to palpation, normal respiratory excursion, incision healed well           Pulmonary: Normal breath sounds. No rales or ronchi.    Abdominal: Abdomen soft, bowel sounds normoactive, no masses, no hepatosplenomegaly, non-tender, no bruits.     Musculoskeletal: No deformities, clubbing, cyanosis, erythema, or edema observed. There are no spinal abnormalities noted. Normal muscle strength and tone. Pulses full and equal in all extremities, no bruits auscultated.     Neurological: No gross motor or sensory deficits noted, affect appropriate, oriented to time, person, place.     Skin: Warm and  dry to the touch, no apparent skin lesions or masses noted.     Psychiatric: He has a normal mood and affect. His behavior is normal. Judgment and thought content normal.           ECG 12 Lead  Date/Time: 3/15/2017 9:41 AM  Performed by: VITALIY PITTS  Authorized by: VITALIY PITTS   Rhythm: sinus rhythm  Rate: normal  Conduction: non-specific intraventricular conduction delay  QRS axis: right  Comments: EKG showed sinus rhythm heart rate of 77 bpm.  Left atrial enlargement.  Right axis deviation.  Nonspecific IVCD and nonspecific ST-T changes              Lab Results   Component Value Date    WBC 10.28 (H) 01/27/2017    HGB 11.1 (L) 01/27/2017    HCT 35.1 (L) 01/27/2017    MCV 92.9 01/27/2017     01/27/2017     Lab Results   Component Value Date    GLUCOSE 82 01/27/2017    BUN 7 01/27/2017    CREATININE 0.77 01/27/2017    EGFRIFNONA 107 01/27/2017    BCR 9.1 01/27/2017    CO2 32.0 (H) 01/27/2017    CALCIUM 9.4 01/27/2017    ALBUMIN 3.90 01/27/2017    LABIL2 1.1 01/27/2017    AST 43 01/27/2017    ALT 29 01/27/2017     Lab Results   Component Value Date    CHOL 148 01/27/2017     Lab Results   Component Value Date    TRIG 204 (H) 01/27/2017     Lab Results   Component Value Date    HDL 37 (L) 01/27/2017     No results found for: LDLCALC  No results found for: LDL  No results found for: HDLLDLRATIO  No components found for: CHOLHDL  Lab Results   Component Value Date    HGBA1C 5.8 (H) 01/09/2017     Lab Results   Component Value Date    TSH 1.20 01/09/2017           ASSESSMENT AND PLAN    Mr. Castañeda is progressing well following his coronary artery bypass surgery for multivessel CAD.  No signs of angina, arrhythmia or congestive heart failure was noted.  To reassess his left ventricular function and echocardiogram is being arranged.  His ejection fraction was 15% prior to bypass surgery.  His present antiplatelet therapy with aspirin, Plavix management of LV dysfunction with Coreg, digoxin  and lipid-lowering therapy with protocol has been continued.  I'll consider adding losartan after assessing his LV function.  He has had borderline blood pressures and this is a reason why ACE/ARB was not started.  Diagnoses and all orders for this visit:    Coronary artery disease involving native coronary artery of native heart without angina pectoris  -     Adult Transthoracic Echo Complete; Future    Ischemic cardiomyopathy  -     Adult Transthoracic Echo Complete; Future    S/P CABG (coronary artery bypass graft)  -     Adult Transthoracic Echo Complete; Future        Alicia Pearce MD  3/15/2017  9:34 AM

## 2017-04-13 ENCOUNTER — TELEPHONE (OUTPATIENT)
Dept: CARDIOLOGY | Facility: CLINIC | Age: 52
End: 2017-04-13

## 2017-04-13 ENCOUNTER — DOCUMENTATION (OUTPATIENT)
Dept: CARDIOLOGY | Facility: CLINIC | Age: 52
End: 2017-04-13

## 2017-04-13 RX ORDER — LOSARTAN POTASSIUM 25 MG/1
25 TABLET ORAL DAILY
Qty: 30 TABLET | Refills: 6 | Status: SHIPPED | OUTPATIENT
Start: 2017-04-13 | End: 2017-10-27 | Stop reason: SDUPTHER

## 2017-04-27 ENCOUNTER — OFFICE VISIT (OUTPATIENT)
Dept: FAMILY MEDICINE CLINIC | Facility: CLINIC | Age: 52
End: 2017-04-27

## 2017-04-27 VITALS
WEIGHT: 141 LBS | HEART RATE: 74 BPM | SYSTOLIC BLOOD PRESSURE: 110 MMHG | DIASTOLIC BLOOD PRESSURE: 70 MMHG | BODY MASS INDEX: 22.08 KG/M2 | OXYGEN SATURATION: 100 %

## 2017-04-27 DIAGNOSIS — I25.10 CORONARY ARTERY DISEASE INVOLVING NATIVE CORONARY ARTERY OF NATIVE HEART WITHOUT ANGINA PECTORIS: ICD-10-CM

## 2017-04-27 PROCEDURE — 99214 OFFICE O/P EST MOD 30 MIN: CPT | Performed by: FAMILY MEDICINE

## 2017-04-27 RX ORDER — CARVEDILOL 3.12 MG/1
3.12 TABLET ORAL 2 TIMES DAILY WITH MEALS
Qty: 60 TABLET | Refills: 5 | Status: SHIPPED | OUTPATIENT
Start: 2017-04-27 | End: 2017-10-02 | Stop reason: SDUPTHER

## 2017-04-27 RX ORDER — DIGOXIN 125 MCG
125 TABLET ORAL
Qty: 30 TABLET | Refills: 5 | Status: SHIPPED | OUTPATIENT
Start: 2017-04-27 | End: 2017-10-27 | Stop reason: SDUPTHER

## 2017-04-27 RX ORDER — CLOPIDOGREL BISULFATE 75 MG/1
75 TABLET ORAL DAILY
Qty: 30 TABLET | Refills: 5 | Status: SHIPPED | OUTPATIENT
Start: 2017-04-27 | End: 2017-10-27 | Stop reason: SDUPTHER

## 2017-04-27 RX ORDER — PRAVASTATIN SODIUM 20 MG
20 TABLET ORAL DAILY
Qty: 30 TABLET | Refills: 5 | Status: SHIPPED | OUTPATIENT
Start: 2017-04-27 | End: 2017-08-01 | Stop reason: DRUGHIGH

## 2017-04-27 NOTE — PROGRESS NOTES
Subjective   Dashawn Castañeda is a 52 y.o. male.     Hypertension   This is a chronic problem. The current episode started more than 1 year ago. The problem is unchanged. The problem is controlled. Associated symptoms include chest pain (chest wall sore) and peripheral edema (at doner site). Pertinent negatives include no orthopnea, palpitations, PND or shortness of breath.   Coronary Artery Disease   Presents for follow-up visit. Symptoms include chest pain (chest wall sore), leg swelling and weight gain. Pertinent negatives include no chest pressure, chest tightness, palpitations or shortness of breath. Risk factors include hyperlipidemia. The symptoms have been stable.   Hyperlipidemia   Associated symptoms include chest pain (chest wall sore). Pertinent negatives include no myalgias or shortness of breath.        The following portions of the patient's history were reviewed and updated as appropriate: allergies, current medications, past family history, past medical history, past social history, past surgical history and problem list.    Review of Systems   Constitutional: Positive for weight gain. Negative for chills.   Respiratory: Negative for chest tightness and shortness of breath.    Cardiovascular: Positive for chest pain (chest wall sore) and leg swelling. Negative for palpitations, orthopnea and PND.   Gastrointestinal:        Dysphagia to solids since on vent.   Musculoskeletal: Negative for myalgias.   Skin: Positive for rash (ecchymosis left thigh and leg). Negative for wound.   Neurological: Positive for numbness.       Objective   Physical Exam   Constitutional: He is oriented to person, place, and time. He appears well-developed and well-nourished. No distress.   HENT:   Head: Normocephalic and atraumatic.   Right Ear: Hearing normal.   Left Ear: Hearing normal.   Mouth/Throat: Uvula is midline and mucous membranes are normal.   Cardiovascular: Normal rate, regular rhythm, normal heart sounds and  intact distal pulses.  Exam reveals no gallop and no friction rub.    No murmur heard.  Pulmonary/Chest: Effort normal. No respiratory distress. He has no decreased breath sounds. He has no wheezes. He has no rhonchi. He has no rales. He exhibits no tenderness.   Abdominal: Normal appearance. There is no hepatosplenomegaly.   Musculoskeletal: He exhibits edema. He exhibits no tenderness.       Vascular Status -  His exam exhibits right foot vasculature normal. His exam exhibits no right foot edema. His exam exhibits left foot vasculature normal and left foot edema.  Lymphadenopathy:        Right cervical: No superficial cervical, no deep cervical and no posterior cervical adenopathy present.       Left cervical: No superficial cervical, no deep cervical and no posterior cervical adenopathy present.   Neurological: He is alert and oriented to person, place, and time. No sensory deficit.   Skin: Skin is warm and dry. No rash noted. He is not diaphoretic.   Psychiatric: He has a normal mood and affect. His behavior is normal. Judgment and thought content normal. Cognition and memory are normal.   Nursing note and vitals reviewed.      Assessment/Plan   Problems Addressed this Visit        Cardiovascular and Mediastinum    Coronary artery disease involving native coronary artery without angina pectoris    Relevant Medications    carvedilol (COREG) 3.125 MG tablet    clopidogrel (PLAVIX) 75 MG tablet    digoxin (LANOXIN) 125 MCG tablet              Follow-up post CABG

## 2017-07-27 ENCOUNTER — APPOINTMENT (OUTPATIENT)
Dept: LAB | Facility: HOSPITAL | Age: 52
End: 2017-07-27

## 2017-07-27 ENCOUNTER — OFFICE VISIT (OUTPATIENT)
Dept: FAMILY MEDICINE CLINIC | Facility: CLINIC | Age: 52
End: 2017-07-27

## 2017-07-27 VITALS
BODY MASS INDEX: 23.95 KG/M2 | HEART RATE: 65 BPM | OXYGEN SATURATION: 98 % | WEIGHT: 152.6 LBS | DIASTOLIC BLOOD PRESSURE: 80 MMHG | HEIGHT: 67 IN | SYSTOLIC BLOOD PRESSURE: 122 MMHG

## 2017-07-27 DIAGNOSIS — I10 ESSENTIAL HYPERTENSION: ICD-10-CM

## 2017-07-27 DIAGNOSIS — E78.00 PURE HYPERCHOLESTEROLEMIA: ICD-10-CM

## 2017-07-27 DIAGNOSIS — I25.10 CORONARY ARTERY DISEASE INVOLVING NATIVE CORONARY ARTERY OF NATIVE HEART WITHOUT ANGINA PECTORIS: Primary | ICD-10-CM

## 2017-07-27 DIAGNOSIS — Z12.5 ENCOUNTER FOR PROSTATE CANCER SCREENING: ICD-10-CM

## 2017-07-27 LAB — PSA SERPL-MCNC: 1.15 NG/ML (ref 0–4)

## 2017-07-27 PROCEDURE — 86803 HEPATITIS C AB TEST: CPT | Performed by: FAMILY MEDICINE

## 2017-07-27 PROCEDURE — 80061 LIPID PANEL: CPT | Performed by: FAMILY MEDICINE

## 2017-07-27 PROCEDURE — 84153 ASSAY OF PSA TOTAL: CPT | Performed by: FAMILY MEDICINE

## 2017-07-27 PROCEDURE — 36415 COLL VENOUS BLD VENIPUNCTURE: CPT | Performed by: FAMILY MEDICINE

## 2017-07-27 PROCEDURE — 99214 OFFICE O/P EST MOD 30 MIN: CPT | Performed by: FAMILY MEDICINE

## 2017-07-27 PROCEDURE — 80053 COMPREHEN METABOLIC PANEL: CPT | Performed by: FAMILY MEDICINE

## 2017-07-27 RX ORDER — SULFAMETHOXAZOLE AND TRIMETHOPRIM 800; 160 MG/1; MG/1
1 TABLET ORAL 2 TIMES DAILY
Qty: 14 TABLET | Refills: 0 | Status: SHIPPED | OUTPATIENT
Start: 2017-07-27 | End: 2017-08-03

## 2017-07-27 NOTE — PROGRESS NOTES
Subjective   Dashawn Castañeda is a 52 y.o. male.     Hypertension   This is a chronic problem. The current episode started more than 1 year ago. The problem is unchanged. The problem is controlled. Pertinent negatives include no chest pain, orthopnea, palpitations, peripheral edema, PND or shortness of breath.   Coronary Artery Disease   Presents for follow-up visit. Symptoms include leg swelling and weight gain. Pertinent negatives include no chest pain, chest pressure, chest tightness, palpitations or shortness of breath. Risk factors include hyperlipidemia. The symptoms have been stable.   Hyperlipidemia   This is a chronic problem. The current episode started more than 1 year ago. The problem is controlled. Recent lipid tests were reviewed and are normal. Pertinent negatives include no chest pain, myalgias or shortness of breath.   URI    This is a recurrent problem. The current episode started in the past 7 days. The problem has been rapidly worsening. There has been no fever. Associated symptoms include congestion and rhinorrhea. Pertinent negatives include no chest pain, coughing or wheezing.        The following portions of the patient's history were reviewed and updated as appropriate: allergies, current medications, past family history, past medical history, past social history, past surgical history and problem list.    Review of Systems   Constitutional: Positive for weight gain. Negative for chills.   HENT: Positive for congestion and rhinorrhea.    Respiratory: Negative for cough, chest tightness, shortness of breath and wheezing.    Cardiovascular: Positive for leg swelling. Negative for chest pain, palpitations, orthopnea and PND.   Musculoskeletal: Negative for myalgias.   Neurological: Positive for numbness.       Objective   Physical Exam   Constitutional: He is oriented to person, place, and time. He appears well-developed and well-nourished. No distress.   HENT:   Head: Normocephalic and atraumatic.    Right Ear: Hearing normal.   Left Ear: Hearing normal.   Nose: Mucosal edema and rhinorrhea present. Right sinus exhibits maxillary sinus tenderness. Left sinus exhibits maxillary sinus tenderness.   Mouth/Throat: Uvula is midline, oropharynx is clear and moist and mucous membranes are normal.   Cardiovascular: Normal rate, regular rhythm, normal heart sounds and intact distal pulses.  Exam reveals no gallop and no friction rub.    No murmur heard.  Pulmonary/Chest: Effort normal. No respiratory distress. He has no decreased breath sounds. He has no wheezes. He has no rhonchi. He has no rales. He exhibits no tenderness.   Abdominal: Normal appearance. There is no hepatosplenomegaly.   Musculoskeletal: He exhibits edema. He exhibits no tenderness.       Vascular Status -  His exam exhibits right foot vasculature normal. His exam exhibits no right foot edema. His exam exhibits left foot vasculature normal and left foot edema.  Lymphadenopathy:        Right cervical: No superficial cervical, no deep cervical and no posterior cervical adenopathy present.       Left cervical: No superficial cervical, no deep cervical and no posterior cervical adenopathy present.   Neurological: He is alert and oriented to person, place, and time. No sensory deficit.   Skin: Skin is warm and dry. No rash noted. He is not diaphoretic.   Psychiatric: He has a normal mood and affect. His behavior is normal. Judgment and thought content normal. Cognition and memory are normal.   Nursing note and vitals reviewed.      Assessment/Plan   Problems Addressed this Visit        Cardiovascular and Mediastinum    Coronary artery disease involving native coronary artery without angina pectoris - Primary    Essential hypertension    Relevant Orders    Comprehensive Metabolic Panel    Pure hypercholesterolemia    Relevant Orders    Lipid Panel      Other Visit Diagnoses     Encounter for prostate cancer screening        Relevant Orders    PSA               Follow-up post CABG

## 2017-07-31 ENCOUNTER — TELEPHONE (OUTPATIENT)
Dept: FAMILY MEDICINE CLINIC | Facility: CLINIC | Age: 52
End: 2017-07-31

## 2017-07-31 NOTE — TELEPHONE ENCOUNTER
----- Message from Clemente Johnson MD sent at 7/28/2017 11:22 AM CDT -----  Cholesterol 2 high.  Increase pravastatin from 20 mg at night to 40 mg at night.  Call in a prescription and change the medicine list.  Recheck CMP and lipids in 3 months

## 2017-08-01 ENCOUNTER — TELEPHONE (OUTPATIENT)
Dept: FAMILY MEDICINE CLINIC | Facility: CLINIC | Age: 52
End: 2017-08-01

## 2017-08-01 DIAGNOSIS — E78.00 PURE HYPERCHOLESTEROLEMIA: Primary | ICD-10-CM

## 2017-08-01 RX ORDER — PRAVASTATIN SODIUM 40 MG
40 TABLET ORAL NIGHTLY
Qty: 30 TABLET | Refills: 3 | Status: SHIPPED | OUTPATIENT
Start: 2017-08-01 | End: 2017-10-27 | Stop reason: SDUPTHER

## 2017-09-13 ENCOUNTER — OFFICE VISIT (OUTPATIENT)
Dept: CARDIOLOGY | Facility: CLINIC | Age: 52
End: 2017-09-13

## 2017-09-13 VITALS
WEIGHT: 154 LBS | HEIGHT: 67 IN | BODY MASS INDEX: 24.17 KG/M2 | HEART RATE: 64 BPM | SYSTOLIC BLOOD PRESSURE: 114 MMHG | DIASTOLIC BLOOD PRESSURE: 64 MMHG

## 2017-09-13 DIAGNOSIS — I10 ESSENTIAL HYPERTENSION: ICD-10-CM

## 2017-09-13 DIAGNOSIS — I25.10 CORONARY ARTERY DISEASE INVOLVING NATIVE CORONARY ARTERY OF NATIVE HEART WITHOUT ANGINA PECTORIS: Primary | ICD-10-CM

## 2017-09-13 DIAGNOSIS — Z95.1 S/P CABG (CORONARY ARTERY BYPASS GRAFT): ICD-10-CM

## 2017-09-13 DIAGNOSIS — I25.5 ISCHEMIC CARDIOMYOPATHY: ICD-10-CM

## 2017-09-13 PROCEDURE — 99213 OFFICE O/P EST LOW 20 MIN: CPT | Performed by: INTERNAL MEDICINE

## 2017-09-13 NOTE — PROGRESS NOTES
Dashawn Castañeda  52 y.o. male    09/13/2017  1. Coronary artery disease involving native coronary artery of native heart without angina pectoris    2. Essential hypertension    3. Ischemic cardiomyopathy    4. S/P CABG (coronary artery bypass graft)        History of Present Illness    Mr. Castañeda is here for follow-up of his above stated problems.  He has done remarkably well following his bypass surgery and symptomatically 100% better.  He feels better than he did in the last 20 years according to him.  His blood pressures in the normal range.  His been compliant with his medications and has quit smoking.  He claims to watch his diet very closely.  No signs of congestive heart failure or angina was noted.  He indicates that there is no restriction any physical activity.        SUBJECTIVE    No Known Allergies      Past Medical History:   Diagnosis Date   • Allergic     gras   • Anxiety    • CHF (congestive heart failure)    • COPD (chronic obstructive pulmonary disease)    • Coronary artery disease     post MI Stents   • Hyperlipidemia    • Hypertension    • Myocardial infarction          Past Surgical History:   Procedure Laterality Date   • CARDIAC CATHETERIZATION     • CORONARY ARTERY BYPASS GRAFT           Family History   Problem Relation Age of Onset   • Hypertension Mother    • Heart disease Father          Social History     Social History   • Marital status:      Spouse name: N/A   • Number of children: N/A   • Years of education: N/A     Occupational History   • Not on file.     Social History Main Topics   • Smoking status: Former Smoker     Packs/day: 0.50     Years: 38.00     Types: Cigarettes   • Smokeless tobacco: Never Used   • Alcohol use No      Comment: quit 1 year ago   • Drug use: Yes     Special: Marijuana   • Sexual activity: Yes     Other Topics Concern   • Not on file     Social History Narrative         Current Outpatient Prescriptions   Medication Sig Dispense Refill   • albuterol  "(PROVENTIL) (5 MG/ML) 0.5% nebulizer solution Take 2.5 mg by nebulization Every 6 (Six) Hours As Needed for wheezing.     • aspirin 81 MG chewable tablet Chew 81 mg Daily.     • carvedilol (COREG) 3.125 MG tablet Take 1 tablet by mouth 2 (Two) Times a Day With Meals. 60 tablet 5   • clopidogrel (PLAVIX) 75 MG tablet Take 1 tablet by mouth Daily. 30 tablet 5   • digoxin (LANOXIN) 125 MCG tablet Take 1 tablet by mouth Daily. 30 tablet 5   • losartan (COZAAR) 25 MG tablet Take 1 tablet by mouth Daily. 30 tablet 6   • Magnesium 250 MG tablet Take 2 tablets by mouth Daily.     • Multiple Vitamins-Minerals (COMPLETE MULTIVITAMIN/MINERAL PO) Take 1 tablet by mouth Daily.     • pravastatin (PRAVACHOL) 40 MG tablet Take 1 tablet by mouth Every Night. 30 tablet 3   • vitamin C (ASCORBIC ACID) 500 MG tablet Take 500 mg by mouth Daily.       No current facility-administered medications for this visit.          OBJECTIVE    /64  Pulse 64  Ht 67\" (170.2 cm)  Wt 154 lb (69.9 kg)  BMI 24.12 kg/m2        Review of Systems     Constitutional:  Denies recent weight loss, weight gain, fever or chills, no change in exercise tolerance     HENT:  Denies any hearing loss, epistaxis, hoarseness, or difficulty speaking.     Eyes: Wears eyeglasses or contact lenses     Respiratory:  Denies dyspnea with exertion,no cough, wheezing, or hemoptysis.     Cardiovascular: Negative for palpations, chest pain, orthopnea, PND, peripheral edema, syncope, or claudication.     Gastrointestinal:  Denies change in bowel habits, dyspepsia, ulcer disease, hematochezia, or melena.     Endocrine: Negative for cold intolerance, heat intolerance, polydipsia, polyphagia and polyuria. Denies any history of weight change, heat/cold intolerance, polydipsia, polyuria     Genitourinary: Negative.      Musculoskeletal: Denies any history of arthritic symptoms or back problems     Skin:  Denies any change in hair or nails, rashes, or skin lesions. "     Allergic/Immunologic: Negative.  Negative for environmental allergies, food allergies and immunocompromised state.     Neurological:  Denies any history of recurrent headaches, strokes, TIA, or seizure disorder.     Hematological: Denies any food allergies, seasonal allergies, bleeding disorders, or lymphadenopathy.       Physical Exam     Constitutional: Cooperative, alert and oriented,  in no acute distress.     HENT:   Head: Normocephalic, normal hair patterns, no masses or tenderness.  Ears, Nose, and Throat: No gross abnormalities. No pallor or cyanosis.   Eyes: EOMS intact, PERRL, conjunctivae and lids unremarkable. Fundoscopic exam and visual fields not performed.   Neck: No palpable masses or adenopathy, no thyromegaly, no JVD, carotid pulses are full and equal bilaterally and without  Bruits.     Cardiovascular: Regular rhythm, S1 and S2 normal, no S3 or S4. No murmurs, gallops, or rubs detected.     Pulmonary/Chest: Chest: normal symmetry, no tenderness to palpation, normal respiratory excursion, no use of accessory muscles.            Pulmonary: Normal breath sounds. No rales or ronchi.    Abdominal: Abdomen soft, bowel sounds normoactive, no masses, no hepatosplenomegaly, non-tender, no bruits.     Musculoskeletal: No deformities, clubbing, cyanosis, erythema, or edema observed.Normal muscle strength and tone. Pulses full and equal in all extremities, no bruits auscultated.     Neurological: No gross motor or sensory deficits noted, affect appropriate, oriented to time, person, place.     Skin: Warm and dry to the touch, no apparent skin lesions or masses noted.     Psychiatric: He has a normal mood and affect. His behavior is normal. Judgment and thought content normal.         Procedures      Lab Results   Component Value Date    WBC 10.28 (H) 01/27/2017    HGB 11.1 (L) 01/27/2017    HCT 35.1 (L) 01/27/2017    MCV 92.9 01/27/2017     01/27/2017     Lab Results   Component Value Date     GLUCOSE 81 07/27/2017    BUN 10 07/27/2017    CREATININE 0.88 07/27/2017    EGFRIFNONA 91 07/27/2017    BCR 11.4 07/27/2017    CO2 28.0 07/27/2017    CALCIUM 9.5 07/27/2017    ALBUMIN 4.30 07/27/2017    LABIL2 1.1 07/27/2017    AST 48 07/27/2017    ALT 27 07/27/2017     Lab Results   Component Value Date    CHOL 208 (H) 07/27/2017    CHOL 148 01/27/2017     Lab Results   Component Value Date    TRIG 402 (H) 07/27/2017    TRIG 204 (H) 01/27/2017     Lab Results   Component Value Date    HDL 36 (L) 07/27/2017    HDL 37 (L) 01/27/2017     No results found for: LDLCALC  No results found for: LDL  No results found for: HDLLDLRATIO  No components found for: CHOLHDL  Lab Results   Component Value Date    HGBA1C 5.8 (H) 01/09/2017     Lab Results   Component Value Date    TSH 1.20 01/09/2017           ASSESSMENT AND PLAN  Mr. Castañeda is stable from a cardiac standpoint and symptomatically much better, being able to perform all his activities of daily living without any restrictions.  His ejection fraction in March this year was 20% with global hypokinesis of the left ventricle.  I did discuss with him, AICD placement for primary prevention in view of his LV dysfunction.  Patient however feels symptomatically well and does not wish to pursue this.  He is fully aware of the risks and benefits.  His present antiplatelet therapy with aspirin and Plavix, management of LV dysfunction with Coreg, digoxin, losartan and lipid-lowering therapy with Pravachol has been continued.    Diagnoses and all orders for this visit:    Coronary artery disease involving native coronary artery of native heart without angina pectoris    Essential hypertension    Ischemic cardiomyopathy    S/P CABG (coronary artery bypass graft)        Alicia Pearce MD  9/13/2017  10:42 AM

## 2017-10-02 RX ORDER — CARVEDILOL 3.12 MG/1
TABLET ORAL
Qty: 60 TABLET | Refills: 5 | Status: SHIPPED | OUTPATIENT
Start: 2017-10-02 | End: 2017-10-27 | Stop reason: SDUPTHER

## 2017-10-04 RX ORDER — CARVEDILOL 3.12 MG/1
TABLET ORAL
Qty: 60 TABLET | Refills: 5 | Status: SHIPPED | OUTPATIENT
Start: 2017-10-04 | End: 2018-03-14 | Stop reason: SDUPTHER

## 2017-10-27 ENCOUNTER — LAB (OUTPATIENT)
Dept: LAB | Facility: HOSPITAL | Age: 52
End: 2017-10-27

## 2017-10-27 ENCOUNTER — OFFICE VISIT (OUTPATIENT)
Dept: FAMILY MEDICINE CLINIC | Facility: CLINIC | Age: 52
End: 2017-10-27

## 2017-10-27 VITALS
OXYGEN SATURATION: 93 % | DIASTOLIC BLOOD PRESSURE: 81 MMHG | SYSTOLIC BLOOD PRESSURE: 138 MMHG | HEIGHT: 67 IN | WEIGHT: 154.1 LBS | BODY MASS INDEX: 24.19 KG/M2 | HEART RATE: 71 BPM

## 2017-10-27 DIAGNOSIS — E78.00 PURE HYPERCHOLESTEROLEMIA: ICD-10-CM

## 2017-10-27 DIAGNOSIS — I10 ESSENTIAL HYPERTENSION: Primary | ICD-10-CM

## 2017-10-27 DIAGNOSIS — I25.10 CORONARY ARTERY DISEASE INVOLVING NATIVE CORONARY ARTERY OF NATIVE HEART WITHOUT ANGINA PECTORIS: ICD-10-CM

## 2017-10-27 LAB
ALBUMIN SERPL-MCNC: 4.7 G/DL (ref 3.4–4.8)
ALBUMIN/GLOB SERPL: 1.3 G/DL (ref 1.1–1.8)
ALP SERPL-CCNC: 68 U/L (ref 38–126)
ALT SERPL W P-5'-P-CCNC: 28 U/L (ref 21–72)
ANION GAP SERPL CALCULATED.3IONS-SCNC: 13 MMOL/L (ref 5–15)
ARTICHOKE IGE QN: 93 MG/DL (ref 1–129)
AST SERPL-CCNC: 67 U/L (ref 17–59)
BILIRUB SERPL-MCNC: 0.8 MG/DL (ref 0.2–1.3)
BUN BLD-MCNC: 6 MG/DL (ref 7–21)
BUN/CREAT SERPL: 6.9 (ref 7–25)
CALCIUM SPEC-SCNC: 9.6 MG/DL (ref 8.4–10.2)
CHLORIDE SERPL-SCNC: 98 MMOL/L (ref 95–110)
CHOLEST SERPL-MCNC: 185 MG/DL (ref 0–199)
CO2 SERPL-SCNC: 27 MMOL/L (ref 22–31)
CREAT BLD-MCNC: 0.87 MG/DL (ref 0.7–1.3)
GFR SERPL CREATININE-BSD FRML MDRD: 92 ML/MIN/1.73 (ref 56–130)
GLOBULIN UR ELPH-MCNC: 3.7 GM/DL (ref 2.3–3.5)
GLUCOSE BLD-MCNC: 91 MG/DL (ref 60–100)
HDLC SERPL-MCNC: 33 MG/DL (ref 60–200)
LDLC/HDLC SERPL: 3.1 {RATIO} (ref 0–3.55)
POTASSIUM BLD-SCNC: 4.1 MMOL/L (ref 3.5–5.1)
PROT SERPL-MCNC: 8.4 G/DL (ref 6.3–8.6)
SODIUM BLD-SCNC: 138 MMOL/L (ref 137–145)
TRIGL SERPL-MCNC: 249 MG/DL (ref 20–199)

## 2017-10-27 PROCEDURE — 99214 OFFICE O/P EST MOD 30 MIN: CPT | Performed by: FAMILY MEDICINE

## 2017-10-27 PROCEDURE — 36415 COLL VENOUS BLD VENIPUNCTURE: CPT

## 2017-10-27 PROCEDURE — 90471 IMMUNIZATION ADMIN: CPT | Performed by: FAMILY MEDICINE

## 2017-10-27 PROCEDURE — 80053 COMPREHEN METABOLIC PANEL: CPT | Performed by: FAMILY MEDICINE

## 2017-10-27 PROCEDURE — 80061 LIPID PANEL: CPT | Performed by: FAMILY MEDICINE

## 2017-10-27 PROCEDURE — 90686 IIV4 VACC NO PRSV 0.5 ML IM: CPT | Performed by: FAMILY MEDICINE

## 2017-10-27 RX ORDER — PRAVASTATIN SODIUM 40 MG
40 TABLET ORAL NIGHTLY
Qty: 30 TABLET | Refills: 3 | Status: SHIPPED | OUTPATIENT
Start: 2017-10-27 | End: 2018-03-29 | Stop reason: SDUPTHER

## 2017-10-27 RX ORDER — CLOPIDOGREL BISULFATE 75 MG/1
75 TABLET ORAL DAILY
Qty: 30 TABLET | Refills: 5 | Status: SHIPPED | OUTPATIENT
Start: 2017-10-27

## 2017-10-27 RX ORDER — UBIDECARENONE 100 MG
100 CAPSULE ORAL DAILY
Qty: 30 CAPSULE | Refills: 11 | Status: SHIPPED | OUTPATIENT
Start: 2017-10-27

## 2017-10-27 RX ORDER — LOSARTAN POTASSIUM 25 MG/1
25 TABLET ORAL DAILY
Qty: 30 TABLET | Refills: 6 | Status: SHIPPED | OUTPATIENT
Start: 2017-10-27 | End: 2018-10-10 | Stop reason: SDUPTHER

## 2017-10-27 RX ORDER — CARVEDILOL 3.12 MG/1
TABLET ORAL
Qty: 60 TABLET | Refills: 5 | Status: SHIPPED | OUTPATIENT
Start: 2017-10-27 | End: 2018-06-14 | Stop reason: SDUPTHER

## 2017-10-27 RX ORDER — DIGOXIN 125 MCG
125 TABLET ORAL
Qty: 30 TABLET | Refills: 5 | Status: SHIPPED | OUTPATIENT
Start: 2017-10-27 | End: 2018-03-14 | Stop reason: SDUPTHER

## 2017-10-27 NOTE — PROGRESS NOTES
Subjective   Dashawn Castañeda is a 52 y.o. male.     Coronary Artery Disease   Presents for follow-up visit. Symptoms include leg swelling. Pertinent negatives include no chest pain, chest pressure, chest tightness, palpitations, shortness of breath or weight gain. Risk factors include hyperlipidemia. The symptoms have been stable.   Hypertension   This is a chronic problem. The current episode started more than 1 year ago. The problem is unchanged. The problem is controlled. Pertinent negatives include no chest pain, orthopnea, palpitations, peripheral edema, PND or shortness of breath.   Hyperlipidemia   This is a chronic problem. The current episode started more than 1 year ago. The problem is controlled. Recent lipid tests were reviewed and are normal. Associated symptoms include myalgias. Pertinent negatives include no chest pain or shortness of breath.        The following portions of the patient's history were reviewed and updated as appropriate: allergies, current medications, past family history, past medical history, past social history, past surgical history and problem list.    Review of Systems   Constitutional: Negative for chills and weight gain.   Respiratory: Negative for chest tightness and shortness of breath.    Cardiovascular: Positive for leg swelling. Negative for chest pain, palpitations, orthopnea and PND.   Musculoskeletal: Positive for myalgias.   Neurological: Positive for numbness.       Objective   Physical Exam   Constitutional: He is oriented to person, place, and time. He appears well-developed and well-nourished. No distress.   HENT:   Head: Normocephalic and atraumatic.   Right Ear: Hearing normal.   Left Ear: Hearing normal.   Nose: Mucosal edema and rhinorrhea present. Right sinus exhibits maxillary sinus tenderness. Left sinus exhibits maxillary sinus tenderness.   Mouth/Throat: Uvula is midline, oropharynx is clear and moist and mucous membranes are normal.   Cardiovascular: Normal  rate, regular rhythm, normal heart sounds and intact distal pulses.  Exam reveals no gallop and no friction rub.    No murmur heard.  Pulmonary/Chest: Effort normal. No respiratory distress. He has no decreased breath sounds. He has no wheezes. He has no rhonchi. He has no rales. He exhibits no tenderness.   Abdominal: Normal appearance. There is no hepatosplenomegaly.   Musculoskeletal: He exhibits edema. He exhibits no tenderness.       Vascular Status -  His exam exhibits right foot vasculature normal. His exam exhibits no right foot edema. His exam exhibits left foot vasculature normal and left foot edema.  Lymphadenopathy:        Right cervical: No superficial cervical, no deep cervical and no posterior cervical adenopathy present.       Left cervical: No superficial cervical, no deep cervical and no posterior cervical adenopathy present.   Neurological: He is alert and oriented to person, place, and time. No sensory deficit.   Skin: Skin is warm and dry. No rash noted. He is not diaphoretic.   Psychiatric: He has a normal mood and affect. His behavior is normal. Judgment and thought content normal. Cognition and memory are normal.   Nursing note and vitals reviewed.      Assessment/Plan   Problems Addressed this Visit        Cardiovascular and Mediastinum    Coronary artery disease involving native coronary artery without angina pectoris    Relevant Medications    carvedilol (COREG) 3.125 MG tablet    clopidogrel (PLAVIX) 75 MG tablet    digoxin (LANOXIN) 125 MCG tablet    Essential hypertension - Primary    Relevant Medications    carvedilol (COREG) 3.125 MG tablet    losartan (COZAAR) 25 MG tablet    Pure hypercholesterolemia    Relevant Medications    pravastatin (PRAVACHOL) 40 MG tablet            Ad co Q 10

## 2017-10-31 ENCOUNTER — TELEPHONE (OUTPATIENT)
Dept: FAMILY MEDICINE CLINIC | Facility: CLINIC | Age: 52
End: 2017-10-31

## 2017-11-01 DIAGNOSIS — I25.10 CORONARY ARTERY DISEASE INVOLVING NATIVE CORONARY ARTERY OF NATIVE HEART WITHOUT ANGINA PECTORIS: ICD-10-CM

## 2017-11-01 RX ORDER — DIGOXIN 125 MCG
TABLET ORAL
Qty: 30 TABLET | Refills: 5 | Status: SHIPPED | OUTPATIENT
Start: 2017-11-01 | End: 2018-06-14 | Stop reason: SDUPTHER

## 2017-11-01 RX ORDER — LOSARTAN POTASSIUM 25 MG/1
TABLET ORAL
Qty: 30 TABLET | Refills: 6 | Status: SHIPPED | OUTPATIENT
Start: 2017-11-01 | End: 2018-03-14 | Stop reason: SDUPTHER

## 2018-02-06 RX ORDER — CLOPIDOGREL BISULFATE 75 MG/1
TABLET ORAL
Qty: 30 TABLET | Refills: 5 | Status: SHIPPED | OUTPATIENT
Start: 2018-02-06 | End: 2018-03-14 | Stop reason: SDUPTHER

## 2018-03-14 ENCOUNTER — OFFICE VISIT (OUTPATIENT)
Dept: CARDIOLOGY | Facility: CLINIC | Age: 53
End: 2018-03-14

## 2018-03-14 VITALS
HEART RATE: 66 BPM | DIASTOLIC BLOOD PRESSURE: 70 MMHG | WEIGHT: 160 LBS | SYSTOLIC BLOOD PRESSURE: 120 MMHG | BODY MASS INDEX: 25.11 KG/M2 | HEIGHT: 67 IN

## 2018-03-14 DIAGNOSIS — I25.5 ISCHEMIC CARDIOMYOPATHY: ICD-10-CM

## 2018-03-14 DIAGNOSIS — I10 ESSENTIAL HYPERTENSION: ICD-10-CM

## 2018-03-14 DIAGNOSIS — Z95.1 S/P CABG (CORONARY ARTERY BYPASS GRAFT): ICD-10-CM

## 2018-03-14 DIAGNOSIS — I25.10 CORONARY ARTERY DISEASE INVOLVING NATIVE CORONARY ARTERY OF NATIVE HEART WITHOUT ANGINA PECTORIS: Primary | ICD-10-CM

## 2018-03-14 DIAGNOSIS — E78.00 PURE HYPERCHOLESTEROLEMIA: ICD-10-CM

## 2018-03-14 PROCEDURE — 99214 OFFICE O/P EST MOD 30 MIN: CPT | Performed by: INTERNAL MEDICINE

## 2018-03-14 NOTE — PROGRESS NOTES
Dashawn Castañeda  52 y.o. male    03/14/2018  1. Coronary artery disease involving native coronary artery of native heart without angina pectoris    2. Essential hypertension    3. Ischemic cardiomyopathy    4. Pure hypercholesterolemia    5. S/P CABG (coronary artery bypass graft)        History of Present Illness  Mr. Castañeda is here for follow-up of his above stated problems.  His history is remarkable for coronary artery disease, respiratory failure and underwent CABG as described below:  On 01/12/2017, CABG x4, LIMA to LAD, SVG to OM1 to  OM2, SVG to PDA, and endoscopic vein harvest.     His ejection fraction is known to be 20-25%.  He's been able to perform all his activities of daily living without any restriction and feels much better than he did about 15-20 years prior.  He has quit smoking.  Her blood pressures in the normal range.  There are times when his blood pressure is low and he has had to quit his evening dose of Coreg.  No chest pain, shortness of breath, palpitation with described and no signs of congestive heart failure was noted.      SUBJECTIVE    No Known Allergies      Past Medical History:   Diagnosis Date   • Allergic     gras   • Anxiety    • CHF (congestive heart failure)    • COPD (chronic obstructive pulmonary disease)    • Coronary artery disease     post MI Stents   • Hyperlipidemia    • Hypertension    • Myocardial infarction          Past Surgical History:   Procedure Laterality Date   • CARDIAC CATHETERIZATION     • CORONARY ARTERY BYPASS GRAFT           Family History   Problem Relation Age of Onset   • Hypertension Mother    • Heart disease Father          Social History     Social History   • Marital status:      Spouse name: N/A   • Number of children: N/A   • Years of education: N/A     Occupational History   • Not on file.     Social History Main Topics   • Smoking status: Former Smoker     Packs/day: 0.50     Years: 38.00     Types: Cigarettes   • Smokeless tobacco: Never  "Used   • Alcohol use No      Comment: quit 1 year ago   • Drug use:      Types: Marijuana   • Sexual activity: Yes     Other Topics Concern   • Not on file     Social History Narrative   • No narrative on file         Current Outpatient Prescriptions   Medication Sig Dispense Refill   • albuterol (PROVENTIL) (5 MG/ML) 0.5% nebulizer solution Take 2.5 mg by nebulization Every 6 (Six) Hours As Needed for wheezing.     • aspirin 81 MG chewable tablet Chew 81 mg Daily.     • carvedilol (COREG) 3.125 MG tablet TAKE 1 TABLET BY MOUTH 2 (TWO) TIMES A DAY WITH MEALS (Patient taking differently: Take 3.125 mg by mouth Daily. TAKE 1 TABLET BY MOUTH 2 (TWO) TIMES A DAY WITH MEALS) 60 tablet 5   • clopidogrel (PLAVIX) 75 MG tablet Take 1 tablet by mouth Daily. 30 tablet 5   • coenzyme Q10 100 MG capsule Take 1 capsule by mouth Daily. 30 capsule 11   • digoxin (LANOXIN) 125 MCG tablet TAKE 1 TABLET BY MOUTH DAILY. 30 tablet 5   • losartan (COZAAR) 25 MG tablet Take 1 tablet by mouth Daily. 30 tablet 6   • Magnesium 250 MG tablet Take 2 tablets by mouth Daily.     • Multiple Vitamins-Minerals (COMPLETE MULTIVITAMIN/MINERAL PO) Take 1 tablet by mouth Daily.     • pravastatin (PRAVACHOL) 40 MG tablet Take 1 tablet by mouth Every Night. 30 tablet 3   • vitamin C (ASCORBIC ACID) 500 MG tablet Take 500 mg by mouth Daily.       No current facility-administered medications for this visit.          OBJECTIVE    /70   Pulse 66   Ht 170.2 cm (67\")   Wt 72.6 kg (160 lb)   BMI 25.06 kg/m²         Review of Systems     Constitutional:  Denies recent weight loss, weight gain, fever or chills, no change in exercise tolerance     HENT:  Denies any hearing loss, epistaxis, hoarseness, or difficulty speaking.     Eyes: Wears eyeglasses or contact lenses     Respiratory:  Denies dyspnea with exertion,no cough, wheezing, or hemoptysis.     Cardiovascular: Negative for palpations, chest pain, orthopnea, PND, peripheral edema, syncope, or " claudication.     Gastrointestinal:  Denies change in bowel habits, dyspepsia, ulcer disease, hematochezia, or melena.     Endocrine: Negative for cold intolerance, heat intolerance, polydipsia, polyphagia and polyuria. Denies any history of weight change, heat/cold intolerance, polydipsia, polyuria     Genitourinary: Negative.      Musculoskeletal: Denies any history of arthritic symptoms or back problems     Skin:  Denies any change in hair or nails, rashes, or skin lesions.     Allergic/Immunologic: Negative.  Negative for environmental allergies, food allergies and immunocompromised state.     Neurological:  Denies any history of recurrent headaches, strokes, TIA, or seizure disorder.     Hematological: Denies any food allergies, seasonal allergies, bleeding disorders, or lymphadenopathy.     Psychiatric/Behavioral: Denies any history of depression, substance abuse, or change in cognitive function.         Physical Exam     Constitutional: Cooperative, alert and oriented, well-developed, well-nourished, in no acute distress.     HENT:   Head: Normocephalic, normal hair patterns, no masses or tenderness.  Ears, Nose, and Throat: No gross abnormalities. No pallor or cyanosis.   Eyes: EOMS intact, PERRL, conjunctivae and lids unremarkable. Fundoscopic exam and visual fields not performed.   Neck: No palpable masses or adenopathy, no thyromegaly, no JVD, carotid pulses are full and equal bilaterally and without  Bruits.     Cardiovascular: Regular rhythm, S1 and S2 normal, no S3 or S4. Apical impulse not displaced. No murmurs, gallops, or rubs detected.     Pulmonary/Chest: Chest: normal symmetry, no tenderness to palpation, normal respiratory excursion, no intercostal retraction, no use of accessory muscles.            Pulmonary: Normal breath sounds. No rales or ronchi.    Abdominal: Abdomen soft, bowel sounds normoactive, no masses, no hepatosplenomegaly, non-tender, no bruits.     Musculoskeletal: No  deformities, clubbing, cyanosis, erythema, or edema observed. There are no spinal abnormalities noted. Normal muscle strength and tone. Pulses full and equal in all extremities, no bruits auscultated.     Neurological: No gross motor or sensory deficits noted, affect appropriate, oriented to time, person, place.     Skin: Warm and dry to the touch, no apparent skin lesions or masses noted.     Psychiatric: He has a normal mood and affect. His behavior is normal. Judgment and thought content normal.         Procedures      Lab Results   Component Value Date    WBC 10.28 (H) 01/27/2017    HGB 11.1 (L) 01/27/2017    HCT 35.1 (L) 01/27/2017    MCV 92.9 01/27/2017     01/27/2017     Lab Results   Component Value Date    GLUCOSE 91 10/27/2017    BUN 6 (L) 10/27/2017    CREATININE 0.87 10/27/2017    EGFRIFNONA 92 10/27/2017    BCR 6.9 (L) 10/27/2017    CO2 27.0 10/27/2017    CALCIUM 9.6 10/27/2017    ALBUMIN 4.70 10/27/2017    LABIL2 1.3 10/27/2017    AST 67 (H) 10/27/2017    ALT 28 10/27/2017     Lab Results   Component Value Date    CHOL 185 10/27/2017    CHOL 208 (H) 07/27/2017    CHOL 148 01/27/2017     Lab Results   Component Value Date    TRIG 249 (H) 10/27/2017    TRIG 402 (H) 07/27/2017    TRIG 204 (H) 01/27/2017     Lab Results   Component Value Date    HDL 33 (L) 10/27/2017    HDL 36 (L) 07/27/2017    HDL 37 (L) 01/27/2017     No components found for: LDLCALC  Lab Results   Component Value Date    LDL 93 10/27/2017     07/27/2017    LDL 69 01/27/2017     No results found for: HDLLDLRATIO  No components found for: CHOLHDL  Lab Results   Component Value Date    HGBA1C 5.8 (H) 01/09/2017     Lab Results   Component Value Date    TSH 1.20 01/09/2017           ASSESSMENT AND PLAN  Mr. Castañeda has made remarkable progress following his coronary artery bypass surgery and is able to perform all his activities of daily living.  I did consider starting him on Entresto but doubt that his blood pressure would be  able to handle it.  I've hence continued antiplatelet therapy with aspirin and Plavix, management of LV dysfunction with Coreg, digoxin, losartan and lipid-lowering therapy with pravastatin has been continued.    Dashawn was seen today for follow-up.    Diagnoses and all orders for this visit:    Coronary artery disease involving native coronary artery of native heart without angina pectoris    Essential hypertension    Ischemic cardiomyopathy    Pure hypercholesterolemia    S/P CABG (coronary artery bypass graft)        Alicia Pearce MD  3/14/2018  10:28 AM

## 2018-03-15 ENCOUNTER — TELEPHONE (OUTPATIENT)
Dept: FAMILY MEDICINE CLINIC | Facility: CLINIC | Age: 53
End: 2018-03-15

## 2018-03-15 ENCOUNTER — APPOINTMENT (OUTPATIENT)
Dept: LAB | Facility: HOSPITAL | Age: 53
End: 2018-03-15

## 2018-03-15 ENCOUNTER — OFFICE VISIT (OUTPATIENT)
Dept: FAMILY MEDICINE CLINIC | Facility: CLINIC | Age: 53
End: 2018-03-15

## 2018-03-15 VITALS
WEIGHT: 159 LBS | DIASTOLIC BLOOD PRESSURE: 86 MMHG | HEIGHT: 67 IN | SYSTOLIC BLOOD PRESSURE: 122 MMHG | BODY MASS INDEX: 24.96 KG/M2

## 2018-03-15 DIAGNOSIS — E78.5 ELEVATED LIPIDS: ICD-10-CM

## 2018-03-15 DIAGNOSIS — I25.10 CORONARY ARTERY DISEASE INVOLVING NATIVE CORONARY ARTERY OF NATIVE HEART WITHOUT ANGINA PECTORIS: ICD-10-CM

## 2018-03-15 DIAGNOSIS — R74.01 ELEVATED AST (SGOT): ICD-10-CM

## 2018-03-15 DIAGNOSIS — E78.00 PURE HYPERCHOLESTEROLEMIA: ICD-10-CM

## 2018-03-15 DIAGNOSIS — I10 ESSENTIAL HYPERTENSION: Primary | ICD-10-CM

## 2018-03-15 DIAGNOSIS — D72.829 LEUKOCYTOSIS, UNSPECIFIED TYPE: Primary | ICD-10-CM

## 2018-03-15 LAB
ALBUMIN SERPL-MCNC: 4.4 G/DL (ref 3.4–4.8)
ALBUMIN/GLOB SERPL: 1.3 G/DL (ref 1.1–1.8)
ALP SERPL-CCNC: 63 U/L (ref 38–126)
ALT SERPL W P-5'-P-CCNC: 32 U/L (ref 21–72)
ANION GAP SERPL CALCULATED.3IONS-SCNC: 13 MMOL/L (ref 5–15)
ARTICHOKE IGE QN: 92 MG/DL (ref 1–129)
AST SERPL-CCNC: 44 U/L (ref 17–59)
BASOPHILS # BLD AUTO: 0.08 10*3/MM3 (ref 0–0.2)
BASOPHILS NFR BLD AUTO: 0.5 % (ref 0–2)
BILIRUB SERPL-MCNC: 0.3 MG/DL (ref 0.2–1.3)
BUN BLD-MCNC: 12 MG/DL (ref 7–21)
BUN/CREAT SERPL: 13.5 (ref 7–25)
CALCIUM SPEC-SCNC: 9.6 MG/DL (ref 8.4–10.2)
CHLORIDE SERPL-SCNC: 101 MMOL/L (ref 95–110)
CHOLEST SERPL-MCNC: 191 MG/DL (ref 0–199)
CO2 SERPL-SCNC: 29 MMOL/L (ref 22–31)
CREAT BLD-MCNC: 0.89 MG/DL (ref 0.7–1.3)
DEPRECATED RDW RBC AUTO: 45.5 FL (ref 35.1–43.9)
EOSINOPHIL # BLD AUTO: 0.63 10*3/MM3 (ref 0–0.7)
EOSINOPHIL NFR BLD AUTO: 4.2 % (ref 0–7)
ERYTHROCYTE [DISTWIDTH] IN BLOOD BY AUTOMATED COUNT: 13.1 % (ref 11.5–14.5)
FERRITIN SERPL-MCNC: 111 NG/ML (ref 17.9–464)
GFR SERPL CREATININE-BSD FRML MDRD: 89 ML/MIN/1.73 (ref 56–130)
GLOBULIN UR ELPH-MCNC: 3.4 GM/DL (ref 2.3–3.5)
GLUCOSE BLD-MCNC: 76 MG/DL (ref 60–100)
HCT VFR BLD AUTO: 49.1 % (ref 39–49)
HDLC SERPL-MCNC: 32 MG/DL (ref 60–200)
HGB BLD-MCNC: 16.9 G/DL (ref 13.7–17.3)
IMM GRANULOCYTES # BLD: 0.06 10*3/MM3 (ref 0–0.02)
IMM GRANULOCYTES NFR BLD: 0.4 % (ref 0–0.5)
IRON 24H UR-MRATE: 99 MCG/DL (ref 49–181)
IRON SATN MFR SERPL: 30 % (ref 20–55)
LDLC/HDLC SERPL: 2.51 {RATIO} (ref 0–3.55)
LYMPHOCYTES # BLD AUTO: 3.49 10*3/MM3 (ref 0.6–4.2)
LYMPHOCYTES NFR BLD AUTO: 23.1 % (ref 10–50)
MCH RBC QN AUTO: 32.6 PG (ref 26.5–34)
MCHC RBC AUTO-ENTMCNC: 34.4 G/DL (ref 31.5–36.3)
MCV RBC AUTO: 94.6 FL (ref 80–98)
MONOCYTES # BLD AUTO: 1.24 10*3/MM3 (ref 0–0.9)
MONOCYTES NFR BLD AUTO: 8.2 % (ref 0–12)
NEUTROPHILS # BLD AUTO: 9.58 10*3/MM3 (ref 2–8.6)
NEUTROPHILS NFR BLD AUTO: 63.6 % (ref 37–80)
PLATELET # BLD AUTO: 223 10*3/MM3 (ref 150–450)
PMV BLD AUTO: 10.9 FL (ref 8–12)
POTASSIUM BLD-SCNC: 4.5 MMOL/L (ref 3.5–5.1)
PROT SERPL-MCNC: 7.8 G/DL (ref 6.3–8.6)
RBC # BLD AUTO: 5.19 10*6/MM3 (ref 4.37–5.74)
SODIUM BLD-SCNC: 143 MMOL/L (ref 137–145)
TIBC SERPL-MCNC: 326 MCG/DL (ref 261–462)
TRIGL SERPL-MCNC: 394 MG/DL (ref 20–199)
WBC NRBC COR # BLD: 15.08 10*3/MM3 (ref 3.2–9.8)

## 2018-03-15 PROCEDURE — 80061 LIPID PANEL: CPT | Performed by: FAMILY MEDICINE

## 2018-03-15 PROCEDURE — 99214 OFFICE O/P EST MOD 30 MIN: CPT | Performed by: FAMILY MEDICINE

## 2018-03-15 PROCEDURE — 85025 COMPLETE CBC W/AUTO DIFF WBC: CPT | Performed by: FAMILY MEDICINE

## 2018-03-15 PROCEDURE — 36415 COLL VENOUS BLD VENIPUNCTURE: CPT | Performed by: FAMILY MEDICINE

## 2018-03-15 PROCEDURE — 80053 COMPREHEN METABOLIC PANEL: CPT | Performed by: FAMILY MEDICINE

## 2018-03-15 PROCEDURE — 82728 ASSAY OF FERRITIN: CPT | Performed by: FAMILY MEDICINE

## 2018-03-15 PROCEDURE — 83550 IRON BINDING TEST: CPT | Performed by: FAMILY MEDICINE

## 2018-03-15 PROCEDURE — 83540 ASSAY OF IRON: CPT | Performed by: FAMILY MEDICINE

## 2018-03-15 RX ORDER — FENOFIBRATE 160 MG/1
160 TABLET ORAL DAILY
Qty: 30 TABLET | Refills: 11 | Status: SHIPPED | OUTPATIENT
Start: 2018-03-15

## 2018-03-15 NOTE — TELEPHONE ENCOUNTER
Pr Dr. Johnson, Mr. Castañeda has been called with his recent lab results & recommendations.  Continue his current medications and follow-up as planned or sooner if any problems.    Referral placed, labs and US ordered.  Script sent to [facility]      ----- Message from Clemente Johnson MD sent at 3/15/2018  4:10 PM CDT -----  His triglycerides have gotten higher again.  Also his white count continues to be elevated.  With an elevated white count and elevated AST I recommend second opinion with hematology at the [facility].  Also, add fenofibrate 160 mg daily.  Recheck a hepatic function and lipid profile in 3 months.  Also because of elevated AST recommend acute hepatitis panel and ultrasound the liver.

## 2018-03-15 NOTE — PROGRESS NOTES
Pr Dr. Johnson, Mr. Castañeda has been called with his recent lab results & recommendations.  Continue his current medications and follow-up as planned or sooner if any problems.    Referral placed, labs and US ordered.  Script sent to [facility]

## 2018-03-15 NOTE — PROGRESS NOTES
His triglycerides have gotten higher again.  Also his white count continues to be elevated.  With an elevated white count and elevated AST I recommend second opinion with hematology at the [facility].  Also, add fenofibrate 160 mg daily.  Recheck a hepatic function and lipid profile in 3 months.  Also because of elevated AST recommend acute hepatitis panel and ultrasound the liver.

## 2018-03-15 NOTE — PROGRESS NOTES
Subjective   Dashawn Castañeda is a 53 y.o. male.     Coronary Artery Disease   Presents for follow-up visit. Symptoms include leg swelling and shortness of breath (BERRY). Pertinent negatives include no chest pain, chest pressure, chest tightness, palpitations or weight gain. Risk factors include hyperlipidemia. The symptoms have been stable.   Hypertension   This is a chronic problem. The current episode started more than 1 year ago. The problem is unchanged. The problem is controlled. Associated symptoms include shortness of breath (BERRY). Pertinent negatives include no chest pain, orthopnea, palpitations, peripheral edema or PND.   Hyperlipidemia   This is a chronic problem. The current episode started more than 1 year ago. The problem is controlled. Recent lipid tests were reviewed and are normal. Associated symptoms include myalgias and shortness of breath (BERRY). Pertinent negatives include no chest pain.        The following portions of the patient's history were reviewed and updated as appropriate: allergies, current medications, past family history, past medical history, past social history, past surgical history and problem list.    Review of Systems   Constitutional: Negative for chills and weight gain.   Respiratory: Positive for shortness of breath (BERRY). Negative for chest tightness.    Cardiovascular: Positive for leg swelling. Negative for chest pain, palpitations, orthopnea and PND.   Musculoskeletal: Positive for myalgias.   Neurological: Positive for numbness.       Objective   Physical Exam   Constitutional: He is oriented to person, place, and time. He appears well-developed and well-nourished. No distress.   HENT:   Head: Normocephalic and atraumatic.   Right Ear: Hearing normal.   Left Ear: Hearing normal.   Nose: Mucosal edema and rhinorrhea present. Right sinus exhibits maxillary sinus tenderness. Left sinus exhibits maxillary sinus tenderness.   Mouth/Throat: Uvula is midline, oropharynx is clear  and moist and mucous membranes are normal.   Cardiovascular: Normal rate, regular rhythm, normal heart sounds and intact distal pulses.  Exam reveals no gallop and no friction rub.    No murmur heard.  Pulmonary/Chest: Effort normal. No respiratory distress. He has no decreased breath sounds. He has no wheezes. He has no rhonchi. He has no rales. He exhibits no tenderness.   Abdominal: Normal appearance. There is no hepatosplenomegaly.   Musculoskeletal: He exhibits edema. He exhibits no tenderness.     Vascular Status -  His right foot exhibits abnormal right foot vasculature . His right foot exhibits normal right foot edema. His left foot exhibits abnormal left foot vasculature  and abnormal left foot edema.  Lymphadenopathy:        Right cervical: No superficial cervical, no deep cervical and no posterior cervical adenopathy present.       Left cervical: No superficial cervical, no deep cervical and no posterior cervical adenopathy present.   Neurological: He is alert and oriented to person, place, and time. No sensory deficit.   Skin: Skin is warm and dry. No rash noted. He is not diaphoretic.   Psychiatric: He has a normal mood and affect. His behavior is normal. Judgment and thought content normal. Cognition and memory are normal.   Nursing note and vitals reviewed.      Assessment/Plan   Problems Addressed this Visit        Cardiovascular and Mediastinum    Essential hypertension - Primary    Relevant Orders    Comprehensive Metabolic Panel    Pure hypercholesterolemia    Relevant Orders    Lipid Panel      Other Visit Diagnoses     Coronary artery disease involving native coronary artery of native heart without angina pectoris        Elevated AST (SGOT)        Relevant Orders    Ferritin    Iron Profile    CBC & Differential    US Liver

## 2018-03-21 ENCOUNTER — TELEPHONE (OUTPATIENT)
Dept: FAMILY MEDICINE CLINIC | Facility: CLINIC | Age: 53
End: 2018-03-21

## 2018-03-21 NOTE — TELEPHONE ENCOUNTER
----- Message from Clemente Johnson MD sent at 3/20/2018  2:05 PM CDT -----  Contact: DOLLY  Once he gets his teeth removed we need to repeat the CBC.  ----- Message -----  From: Zakia Licona MA  Sent: 3/20/2018   1:41 PM  To: Clemente Johnson MD    Pt does not want to have any of the tests done right now that was recommended due to his abnormal labs.  Please see his explanation below.  Please advise, thank you    ----- Message -----  From: Sarah Torres  Sent: 3/20/2018  11:40 AM  To: Zakia Licona MA    Said he thinks the problem with his white blood cell count has to do with his bad teeth.  He said he thinks that he needs to get his teeth taken care of instead of having the tests done and then see how it is.  Said if Dr Johnson wants him to come in and see him or wants to call him that is fine.    [phone]

## 2018-03-22 ENCOUNTER — APPOINTMENT (OUTPATIENT)
Dept: ULTRASOUND IMAGING | Facility: HOSPITAL | Age: 53
End: 2018-03-22
Attending: FAMILY MEDICINE

## 2018-03-29 RX ORDER — PRAVASTATIN SODIUM 40 MG
40 TABLET ORAL NIGHTLY
Qty: 30 TABLET | Refills: 3 | Status: SHIPPED | OUTPATIENT
Start: 2018-03-29 | End: 2018-11-13 | Stop reason: SDUPTHER

## 2018-04-05 ENCOUNTER — APPOINTMENT (OUTPATIENT)
Dept: ONCOLOGY | Facility: CLINIC | Age: 53
End: 2018-04-05

## 2018-04-05 ENCOUNTER — APPOINTMENT (OUTPATIENT)
Dept: ONCOLOGY | Facility: HOSPITAL | Age: 53
End: 2018-04-05

## 2018-05-02 DIAGNOSIS — I25.10 CORONARY ARTERY DISEASE INVOLVING NATIVE CORONARY ARTERY OF NATIVE HEART WITHOUT ANGINA PECTORIS: ICD-10-CM

## 2018-05-02 RX ORDER — DIGOXIN 125 MCG
125 TABLET ORAL
Qty: 30 TABLET | Refills: 5 | Status: SHIPPED | OUTPATIENT
Start: 2018-05-02 | End: 2018-06-14 | Stop reason: SDUPTHER

## 2018-05-02 RX ORDER — CARVEDILOL 3.12 MG/1
TABLET ORAL
Qty: 60 TABLET | Refills: 5 | Status: SHIPPED | OUTPATIENT
Start: 2018-05-02 | End: 2018-06-14 | Stop reason: SDUPTHER

## 2018-06-14 ENCOUNTER — OFFICE VISIT (OUTPATIENT)
Dept: FAMILY MEDICINE CLINIC | Facility: CLINIC | Age: 53
End: 2018-06-14

## 2018-06-14 VITALS
DIASTOLIC BLOOD PRESSURE: 62 MMHG | WEIGHT: 149 LBS | HEIGHT: 67 IN | OXYGEN SATURATION: 99 % | HEART RATE: 52 BPM | SYSTOLIC BLOOD PRESSURE: 120 MMHG | BODY MASS INDEX: 23.39 KG/M2

## 2018-06-14 DIAGNOSIS — I10 ESSENTIAL HYPERTENSION: Primary | ICD-10-CM

## 2018-06-14 DIAGNOSIS — I25.10 CORONARY ARTERY DISEASE INVOLVING NATIVE CORONARY ARTERY OF NATIVE HEART WITHOUT ANGINA PECTORIS: ICD-10-CM

## 2018-06-14 PROCEDURE — 99214 OFFICE O/P EST MOD 30 MIN: CPT | Performed by: FAMILY MEDICINE

## 2018-06-14 RX ORDER — DIGOXIN 125 MCG
125 TABLET ORAL
Qty: 30 TABLET | Refills: 5 | Status: SHIPPED | OUTPATIENT
Start: 2018-06-14

## 2018-06-14 RX ORDER — CARVEDILOL 3.12 MG/1
3.12 TABLET ORAL DAILY
Qty: 30 TABLET | Refills: 5 | Status: SHIPPED | OUTPATIENT
Start: 2018-06-14

## 2018-06-14 NOTE — PROGRESS NOTES
Subjective   Dashawn Castañeda is a 53 y.o. male.     Hypertension   This is a chronic problem. The current episode started more than 1 year ago. The problem is unchanged. The problem is controlled. Associated symptoms include peripheral edema. Pertinent negatives include no chest pain, orthopnea, palpitations, PND or shortness of breath.   Coronary Artery Disease   Presents for follow-up visit. Symptoms include leg swelling. Pertinent negatives include no chest pain, chest pressure, chest tightness, palpitations, shortness of breath or weight gain. Risk factors include hyperlipidemia. The symptoms have been stable.   Hyperlipidemia   This is a chronic problem. The current episode started more than 1 year ago. The problem is controlled. Recent lipid tests were reviewed and are normal. Associated symptoms include myalgias. Pertinent negatives include no chest pain or shortness of breath.        The following portions of the patient's history were reviewed and updated as appropriate: allergies, current medications, past family history, past medical history, past social history, past surgical history and problem list.    Review of Systems   Constitutional: Negative for chills and weight gain.   Respiratory: Negative for chest tightness and shortness of breath.    Cardiovascular: Positive for leg swelling. Negative for chest pain, palpitations, orthopnea and PND.   Musculoskeletal: Positive for myalgias.   Neurological: Positive for numbness.       Objective   Physical Exam   Constitutional: He is oriented to person, place, and time. He appears well-developed and well-nourished. No distress.   HENT:   Head: Normocephalic and atraumatic.   Right Ear: Hearing normal.   Left Ear: Hearing normal.   Nose: Mucosal edema and rhinorrhea present. Right sinus exhibits maxillary sinus tenderness. Left sinus exhibits maxillary sinus tenderness.   Mouth/Throat: Uvula is midline, oropharynx is clear and moist and mucous membranes are  normal.   Cardiovascular: Normal rate, regular rhythm, normal heart sounds and intact distal pulses.  Exam reveals no gallop and no friction rub.    No murmur heard.  Pulmonary/Chest: Effort normal. No respiratory distress. He has no decreased breath sounds. He has no wheezes. He has no rhonchi. He has no rales. He exhibits no tenderness.   Abdominal: Normal appearance. There is no hepatosplenomegaly.   Musculoskeletal: He exhibits edema. He exhibits no tenderness.     Vascular Status -  His right foot exhibits abnormal foot vasculature . His right foot exhibits no edema. His left foot exhibits abnormal foot vasculature  and abnormal foot edema.  Lymphadenopathy:        Right cervical: No superficial cervical, no deep cervical and no posterior cervical adenopathy present.       Left cervical: No superficial cervical, no deep cervical and no posterior cervical adenopathy present.   Neurological: He is alert and oriented to person, place, and time. No sensory deficit.   Skin: Skin is warm and dry. No rash noted. He is not diaphoretic.   Psychiatric: He has a normal mood and affect. His behavior is normal. Judgment and thought content normal. Cognition and memory are normal.   Nursing note and vitals reviewed.      Assessment/Plan   Problems Addressed this Visit        Cardiovascular and Mediastinum    Coronary artery disease involving native coronary artery without angina pectoris    Relevant Medications    carvedilol (COREG) 3.125 MG tablet    digoxin (LANOXIN) 125 MCG tablet    Essential hypertension - Primary    Relevant Medications    carvedilol (COREG) 3.125 MG tablet

## 2018-09-14 ENCOUNTER — TELEPHONE (OUTPATIENT)
Dept: FAMILY MEDICINE CLINIC | Facility: CLINIC | Age: 53
End: 2018-09-14

## 2018-09-14 ENCOUNTER — LAB (OUTPATIENT)
Dept: LAB | Facility: HOSPITAL | Age: 53
End: 2018-09-14

## 2018-09-14 ENCOUNTER — OFFICE VISIT (OUTPATIENT)
Dept: FAMILY MEDICINE CLINIC | Facility: CLINIC | Age: 53
End: 2018-09-14

## 2018-09-14 VITALS
OXYGEN SATURATION: 99 % | DIASTOLIC BLOOD PRESSURE: 70 MMHG | WEIGHT: 147.7 LBS | SYSTOLIC BLOOD PRESSURE: 126 MMHG | BODY MASS INDEX: 23.18 KG/M2 | HEIGHT: 67 IN | HEART RATE: 66 BPM

## 2018-09-14 DIAGNOSIS — Z12.5 ENCOUNTER FOR PROSTATE CANCER SCREENING: ICD-10-CM

## 2018-09-14 DIAGNOSIS — R74.01 ELEVATED AST (SGOT): ICD-10-CM

## 2018-09-14 DIAGNOSIS — I25.10 CORONARY ARTERY DISEASE INVOLVING NATIVE CORONARY ARTERY OF NATIVE HEART WITHOUT ANGINA PECTORIS: Primary | ICD-10-CM

## 2018-09-14 DIAGNOSIS — E78.5 ELEVATED LIPIDS: ICD-10-CM

## 2018-09-14 DIAGNOSIS — E78.00 PURE HYPERCHOLESTEROLEMIA: ICD-10-CM

## 2018-09-14 DIAGNOSIS — J30.1 CHRONIC SEASONAL ALLERGIC RHINITIS DUE TO POLLEN: ICD-10-CM

## 2018-09-14 DIAGNOSIS — Z79.899 HIGH RISK MEDICATION USE: ICD-10-CM

## 2018-09-14 DIAGNOSIS — I10 ESSENTIAL HYPERTENSION: ICD-10-CM

## 2018-09-14 PROBLEM — Z09 FOLLOW-UP SURGERY CARE: Status: RESOLVED | Noted: 2017-01-24 | Resolved: 2018-09-14

## 2018-09-14 LAB
ALBUMIN SERPL-MCNC: 4.4 G/DL (ref 3.4–4.8)
ALBUMIN/GLOB SERPL: 1.2 G/DL (ref 1.1–1.8)
ALP SERPL-CCNC: 46 U/L (ref 38–126)
ALT SERPL W P-5'-P-CCNC: 26 U/L (ref 21–72)
ANION GAP SERPL CALCULATED.3IONS-SCNC: 7 MMOL/L (ref 5–15)
ARTICHOKE IGE QN: 66 MG/DL (ref 1–129)
AST SERPL-CCNC: 48 U/L (ref 17–59)
BILIRUB CONJ SERPL-MCNC: 0 MG/DL (ref 0–0.3)
BILIRUB SERPL-MCNC: 0.5 MG/DL (ref 0.2–1.3)
BUN BLD-MCNC: 17 MG/DL (ref 7–21)
BUN/CREAT SERPL: 14.8 (ref 7–25)
CALCIUM SPEC-SCNC: 9.4 MG/DL (ref 8.4–10.2)
CHLORIDE SERPL-SCNC: 105 MMOL/L (ref 95–110)
CHOLEST SERPL-MCNC: 148 MG/DL (ref 0–199)
CO2 SERPL-SCNC: 27 MMOL/L (ref 22–31)
CREAT BLD-MCNC: 1.15 MG/DL (ref 0.7–1.3)
DIGOXIN SERPL-MCNC: 1 NG/ML (ref 0.8–2)
GFR SERPL CREATININE-BSD FRML MDRD: 67 ML/MIN/1.73 (ref 56–130)
GLOBULIN UR ELPH-MCNC: 3.6 GM/DL (ref 2.3–3.5)
GLUCOSE BLD-MCNC: 93 MG/DL (ref 60–100)
HAV IGM SERPL QL IA: NEGATIVE
HBV CORE IGM SERPL QL IA: NEGATIVE
HBV SURFACE AG SERPL QL IA: NEGATIVE
HCV AB SER DONR QL: NEGATIVE
HDLC SERPL-MCNC: 36 MG/DL (ref 60–200)
LDLC/HDLC SERPL: 2.08 {RATIO} (ref 0–3.55)
POTASSIUM BLD-SCNC: 4.9 MMOL/L (ref 3.5–5.1)
PROT SERPL-MCNC: 8 G/DL (ref 6.3–8.6)
SODIUM BLD-SCNC: 139 MMOL/L (ref 137–145)
TRIGL SERPL-MCNC: 186 MG/DL (ref 20–199)

## 2018-09-14 PROCEDURE — 99214 OFFICE O/P EST MOD 30 MIN: CPT | Performed by: FAMILY MEDICINE

## 2018-09-14 PROCEDURE — 80074 ACUTE HEPATITIS PANEL: CPT

## 2018-09-14 PROCEDURE — 36415 COLL VENOUS BLD VENIPUNCTURE: CPT

## 2018-09-14 PROCEDURE — 96372 THER/PROPH/DIAG INJ SC/IM: CPT | Performed by: FAMILY MEDICINE

## 2018-09-14 PROCEDURE — 80053 COMPREHEN METABOLIC PANEL: CPT | Performed by: FAMILY MEDICINE

## 2018-09-14 PROCEDURE — 80162 ASSAY OF DIGOXIN TOTAL: CPT

## 2018-09-14 PROCEDURE — 80061 LIPID PANEL: CPT | Performed by: FAMILY MEDICINE

## 2018-09-14 PROCEDURE — 82248 BILIRUBIN DIRECT: CPT

## 2018-09-14 RX ORDER — TRIAMCINOLONE ACETONIDE 40 MG/ML
80 INJECTION, SUSPENSION INTRA-ARTICULAR; INTRAMUSCULAR ONCE
Status: COMPLETED | OUTPATIENT
Start: 2018-09-14 | End: 2018-09-14

## 2018-09-14 RX ADMIN — TRIAMCINOLONE ACETONIDE 80 MG: 40 INJECTION, SUSPENSION INTRA-ARTICULAR; INTRAMUSCULAR at 09:12

## 2018-09-14 NOTE — PROGRESS NOTES
Pr Garry Aguero has been called with his recent lab results & recommendations.  Continue his current medications and follow-up as planned or sooner if any problems.

## 2018-09-14 NOTE — PROGRESS NOTES
Subjective   Dashawn Castañeda is a 53 y.o. male.     Hypertension   This is a chronic problem. The current episode started more than 1 year ago. The problem is unchanged. The problem is controlled. Associated symptoms include peripheral edema. Pertinent negatives include no chest pain, orthopnea, palpitations, PND or shortness of breath.   Coronary Artery Disease   Presents for follow-up visit. Symptoms include leg swelling. Pertinent negatives include no chest pain, chest pressure, chest tightness, palpitations, shortness of breath or weight gain. Risk factors include hyperlipidemia. The symptoms have been stable.   Hyperlipidemia   This is a chronic problem. The current episode started more than 1 year ago. The problem is controlled. Recent lipid tests were reviewed and are normal. Associated symptoms include myalgias. Pertinent negatives include no chest pain or shortness of breath.   URI    This is a recurrent problem. The current episode started more than 1 month ago. The problem has been gradually worsening. There has been no fever. Associated symptoms include rhinorrhea. Pertinent negatives include no chest pain, sinus pain or sneezing.        The following portions of the patient's history were reviewed and updated as appropriate: allergies, current medications, past family history, past medical history, past social history, past surgical history and problem list.    Review of Systems   Constitutional: Negative for chills and weight gain.   HENT: Positive for rhinorrhea. Negative for sinus pain and sneezing.    Respiratory: Negative for chest tightness and shortness of breath.    Cardiovascular: Positive for leg swelling. Negative for chest pain, palpitations, orthopnea and PND.   Musculoskeletal: Positive for myalgias.   Neurological: Positive for numbness.       Objective   Physical Exam   Constitutional: He is oriented to person, place, and time. He appears well-developed and well-nourished. No distress.    HENT:   Head: Normocephalic and atraumatic.   Right Ear: Hearing normal.   Left Ear: Hearing normal.   Nose: Mucosal edema and rhinorrhea present.   Mouth/Throat: Uvula is midline, oropharynx is clear and moist and mucous membranes are normal.   Eyes: Right conjunctiva is injected. Left conjunctiva is injected.   Cardiovascular: Normal rate, regular rhythm, normal heart sounds and intact distal pulses.  Exam reveals no gallop and no friction rub.    No murmur heard.  Pulmonary/Chest: Effort normal. No respiratory distress. He has no decreased breath sounds. He has no wheezes. He has no rhonchi. He has no rales. He exhibits no tenderness.   Abdominal: Normal appearance. There is no hepatosplenomegaly.   Musculoskeletal: He exhibits edema. He exhibits no tenderness.     Vascular Status -  His right foot exhibits abnormal foot vasculature . His right foot exhibits no edema. His left foot exhibits abnormal foot vasculature  and abnormal foot edema.  Lymphadenopathy:        Right cervical: No superficial cervical, no deep cervical and no posterior cervical adenopathy present.       Left cervical: No superficial cervical, no deep cervical and no posterior cervical adenopathy present.   Neurological: He is alert and oriented to person, place, and time. No sensory deficit.   Skin: Skin is warm and dry. No rash noted. He is not diaphoretic.   Psychiatric: He has a normal mood and affect. His behavior is normal. Judgment and thought content normal. Cognition and memory are normal.   Nursing note and vitals reviewed.      Assessment/Plan   Problems Addressed this Visit        Cardiovascular and Mediastinum    Coronary artery disease involving native coronary artery without angina pectoris - Primary    Essential hypertension    Relevant Orders    Comprehensive Metabolic Panel    Pure hypercholesterolemia    Relevant Orders    Lipid Panel      Other Visit Diagnoses     Encounter for prostate cancer screening        Chronic  seasonal allergic rhinitis due to pollen        Relevant Medications    triamcinolone acetonide (KENALOG-40) injection 80 mg    High risk medication use        Relevant Orders    Digoxin level

## 2018-09-14 NOTE — TELEPHONE ENCOUNTER
Pr Garry Aguero has been called with his recent lab results & recommendations.  Continue his current medications and follow-up as planned or sooner if any problems.      ----- Message from Clemente Johnson MD sent at 9/14/2018  2:35 PM CDT -----  Ok, call or send card.

## 2018-09-18 ENCOUNTER — OFFICE VISIT (OUTPATIENT)
Dept: CARDIOLOGY | Facility: CLINIC | Age: 53
End: 2018-09-18

## 2018-09-18 VITALS
BODY MASS INDEX: 23.07 KG/M2 | DIASTOLIC BLOOD PRESSURE: 70 MMHG | SYSTOLIC BLOOD PRESSURE: 118 MMHG | HEIGHT: 67 IN | WEIGHT: 147 LBS | HEART RATE: 55 BPM

## 2018-09-18 DIAGNOSIS — I10 ESSENTIAL HYPERTENSION: ICD-10-CM

## 2018-09-18 DIAGNOSIS — Z95.1 S/P CABG (CORONARY ARTERY BYPASS GRAFT): ICD-10-CM

## 2018-09-18 DIAGNOSIS — I25.5 ISCHEMIC CARDIOMYOPATHY: Primary | ICD-10-CM

## 2018-09-18 DIAGNOSIS — E78.00 PURE HYPERCHOLESTEROLEMIA: ICD-10-CM

## 2018-09-18 PROCEDURE — 99214 OFFICE O/P EST MOD 30 MIN: CPT | Performed by: INTERNAL MEDICINE

## 2018-09-18 PROCEDURE — 93000 ELECTROCARDIOGRAM COMPLETE: CPT | Performed by: INTERNAL MEDICINE

## 2018-09-18 NOTE — PROGRESS NOTES
Dashawn Castañeda  53 y.o. male    09/18/2018  1. Ischemic cardiomyopathy    2. Essential hypertension    3. S/P CABG (coronary artery bypass graft)    4. Pure hypercholesterolemia        History of Present Illness    Mr. Castañeda is here for follow-up of his above stated problems.  His history is remarkable for coronary artery disease, respiratory failure and underwent CABG as described below:  On 01/12/2017, CABG x4, LIMA to LAD, SVG to OM1 to  OM2, SVG to PDA, and endoscopic vein harvest.     His ejection fraction is known to be 20-25%.  He's been able to perform all his activities of daily living without any restrictions. His blood pressure was in the normal range.  Unfortunately he still smokes occasionally but has cut back significantly.  We had a discussion about complete smoking cessation.  No signs of congestive heart failure was noted.  His lipid profiles are in the normal range.    SUBJECTIVE    No Known Allergies      Past Medical History:   Diagnosis Date   • Allergic     gras   • Anxiety    • CHF (congestive heart failure) (CMS/[facility])    • COPD (chronic obstructive pulmonary disease) (CMS/[facility])    • Coronary artery disease     post MI Stents   • Hyperlipidemia    • Hypertension    • Myocardial infarction          Past Surgical History:   Procedure Laterality Date   • CARDIAC CATHETERIZATION     • CORONARY ARTERY BYPASS GRAFT           Family History   Problem Relation Age of Onset   • Hypertension Mother    • Heart disease Father          Social History     Social History   • Marital status:      Spouse name: N/A   • Number of children: N/A   • Years of education: N/A     Occupational History   • Not on file.     Social History Main Topics   • Smoking status: Former Smoker     Packs/day: 0.50     Years: 38.00     Types: Cigarettes   • Smokeless tobacco: Never Used   • Alcohol use No      Comment: quit 1 year ago   • Drug use: Yes     Types: Marijuana   • Sexual activity: Yes     Other Topics Concern   • Not  "on file     Social History Narrative   • No narrative on file         Current Outpatient Prescriptions   Medication Sig Dispense Refill   • albuterol (PROVENTIL) (5 MG/ML) 0.5% nebulizer solution Take 2.5 mg by nebulization Every 6 (Six) Hours As Needed for wheezing.     • aspirin 81 MG chewable tablet Chew 81 mg Daily.     • carvedilol (COREG) 3.125 MG tablet Take 1 tablet by mouth Daily. 30 tablet 5   • clopidogrel (PLAVIX) 75 MG tablet Take 1 tablet by mouth Daily. 30 tablet 5   • coenzyme Q10 100 MG capsule Take 1 capsule by mouth Daily. 30 capsule 11   • digoxin (LANOXIN) 125 MCG tablet Take 1 tablet by mouth Daily. 30 tablet 5   • fenofibrate 160 MG tablet Take 1 tablet by mouth Daily. 30 tablet 11   • losartan (COZAAR) 25 MG tablet Take 1 tablet by mouth Daily. 30 tablet 6   • Magnesium 250 MG tablet Take 2 tablets by mouth Daily.     • Multiple Vitamins-Minerals (COMPLETE MULTIVITAMIN/MINERAL PO) Take 1 tablet by mouth Daily.     • pravastatin (PRAVACHOL) 40 MG tablet TAKE 1 TABLET BY MOUTH EVERY NIGHT. 30 tablet 3   • vitamin C (ASCORBIC ACID) 500 MG tablet Take 500 mg by mouth Daily.       No current facility-administered medications for this visit.          OBJECTIVE    /70   Pulse 55   Ht 170.2 cm (67.01\")   Wt 66.7 kg (147 lb)   BMI 23.02 kg/m²         Review of Systems     Constitutional:  Denies recent weight loss, weight gain, fever or chills, no change in exercise tolerance     HENT:  Denies any hearing loss, epistaxis, hoarseness, or difficulty speaking.     Eyes: Wears eyeglasses or contact lenses     Respiratory:  Denies dyspnea with exertion,no cough, wheezing, or hemoptysis.     Cardiovascular: Negative for palpations, chest pain, orthopnea, PND, peripheral edema, syncope, or claudication.     Gastrointestinal:  Denies change in bowel habits, dyspepsia, ulcer disease, hematochezia, or melena.     Endocrine: Negative for cold intolerance, heat intolerance, polydipsia, polyphagia and " polyuria.     Genitourinary: Negative.      Musculoskeletal: Denies any history of arthritic symptoms or back problems     Skin:  Denies any change in hair or nails, rashes, or skin lesions.     Neurological:  Denies any history of recurrent headaches, strokes, TIA, or seizure disorder.     Hematological: Denies any food allergies, seasonal allergies, bleeding disorders, or lymphadenopathy.     Psychiatric/Behavioral: Denies any history of depression, substance abuse, or change in cognitive function.         Physical Exam     Constitutional: Cooperative, alert and oriented,  in no acute distress.     HENT:   Head: Normocephalic, normal hair patterns, no masses or tenderness.  Ears, Nose, and Throat: No gross abnormalities. No pallor or cyanosis.   Eyes: EOMS intact, PERRL, conjunctivae and lids unremarkable. Fundoscopic exam and visual fields not performed.   Neck: No palpable masses or adenopathy, no thyromegaly, no JVD, carotid pulses are full and equal bilaterally and without  Bruits.     Cardiovascular: Regular rhythm, S1 and S2 normal, no S3 or S4.  No murmurs, gallops, or rubs detected.     Pulmonary/Chest: Chest: normal symmetry,  normal respiratory excursion, no intercostal retraction, no use of accessory muscles.            Pulmonary: Normal breath sounds. No rales or ronchi.    Abdominal: Abdomen soft, bowel sounds normoactive, no masses, no hepatosplenomegaly, non-tender, no bruits.     Musculoskeletal: No deformities, clubbing, cyanosis, erythema, or edema observed.     Neurological: No gross motor or sensory deficits noted, affect appropriate, oriented to time, person, place.     Skin: Warm and dry to the touch, no apparent skin lesions or masses noted.     Psychiatric: He has a normal mood and affect. His behavior is normal. Judgment and thought content normal.           ECG 12 Lead  Date/Time: 9/18/2018 9:40 AM  Performed by: VITALIY PITTS  Authorized by: VITALIY PITTS    Comparison: not compared with previous ECG   Rhythm: sinus rhythm  Rate: normal  QRS axis: normal  Comments: Sinus rhythm with heart rate of 55 bpm.  Minimal nonspecific ST-T changes.              Lab Results   Component Value Date    WBC 15.08 (H) 03/15/2018    HGB 16.9 03/15/2018    HCT 49.1 (H) 03/15/2018    MCV 94.6 03/15/2018     03/15/2018     Lab Results   Component Value Date    GLUCOSE 93 09/14/2018    BUN 17 09/14/2018    CREATININE 1.15 09/14/2018    EGFRIFNONA 67 09/14/2018    BCR 14.8 09/14/2018    CO2 27.0 09/14/2018    CALCIUM 9.4 09/14/2018    ALBUMIN 4.40 09/14/2018    AST 48 09/14/2018    ALT 26 09/14/2018     Lab Results   Component Value Date    CHOL 148 09/14/2018    CHOL 191 03/15/2018    CHOL 185 10/27/2017     Lab Results   Component Value Date    TRIG 186 09/14/2018    TRIG 394 (H) 03/15/2018    TRIG 249 (H) 10/27/2017     Lab Results   Component Value Date    HDL 36 (L) 09/14/2018    HDL 32 (L) 03/15/2018    HDL 33 (L) 10/27/2017     No components found for: LDLCALC  Lab Results   Component Value Date    LDL 66 09/14/2018    LDL 92 03/15/2018    LDL 93 10/27/2017     No results found for: HDLLDLRATIO  No components found for: CHOLHDL  Lab Results   Component Value Date    HGBA1C 5.8 (H) 01/09/2017     Lab Results   Component Value Date    TSH 1.20 01/09/2017           ASSESSMENT AND PLAN  Mr. Castañeda is stable with regards to his heart with no evidence of angina, arrhythmia or congestive heart failure.  I've continued antiplatelet therapy with aspirin and Plavix, management of LV dysfunction with Coreg, digoxin, losartan and lipid-lowering therapy with pravastatin has been continued.    Dashawn was seen today for follow-up.    Diagnoses and all orders for this visit:    Ischemic cardiomyopathy    Essential hypertension    S/P CABG (coronary artery bypass graft)    Pure hypercholesterolemia        Alicia Pearce MD  9/18/2018  9:33 AM

## 2018-10-10 RX ORDER — LOSARTAN POTASSIUM 25 MG/1
25 TABLET ORAL DAILY
Qty: 30 TABLET | Refills: 6 | Status: SHIPPED | OUTPATIENT
Start: 2018-10-10

## 2018-11-13 ENCOUNTER — TELEPHONE (OUTPATIENT)
Dept: FAMILY MEDICINE CLINIC | Facility: CLINIC | Age: 53
End: 2018-11-13

## 2018-11-13 RX ORDER — PRAVASTATIN SODIUM 40 MG
40 TABLET ORAL NIGHTLY
Qty: 30 TABLET | Refills: 5 | Status: SHIPPED | OUTPATIENT
Start: 2018-11-13

## 2020-05-15 NOTE — TELEPHONE ENCOUNTER
-Pr Garry Aguero has been called with his recent lab results & recommendations.  Continue his current medications and follow-up as planned or sooner if any problems.      ---- Message from Clemente Johnson MD sent at 9/14/2018  2:40 PM CDT -----  Ok, call or send card.    
No